# Patient Record
Sex: MALE | Race: WHITE | NOT HISPANIC OR LATINO
[De-identification: names, ages, dates, MRNs, and addresses within clinical notes are randomized per-mention and may not be internally consistent; named-entity substitution may affect disease eponyms.]

---

## 2019-01-04 PROBLEM — Z00.00 ENCOUNTER FOR PREVENTIVE HEALTH EXAMINATION: Status: ACTIVE | Noted: 2019-01-04

## 2019-01-15 ENCOUNTER — APPOINTMENT (OUTPATIENT)
Dept: SURGERY | Facility: CLINIC | Age: 64
End: 2019-01-15

## 2019-01-15 ENCOUNTER — OUTPATIENT (OUTPATIENT)
Dept: OUTPATIENT SERVICES | Facility: HOSPITAL | Age: 64
LOS: 1 days | End: 2019-01-15
Payer: COMMERCIAL

## 2019-01-15 VITALS
HEART RATE: 60 BPM | SYSTOLIC BLOOD PRESSURE: 133 MMHG | WEIGHT: 288.81 LBS | TEMPERATURE: 99 F | DIASTOLIC BLOOD PRESSURE: 76 MMHG | RESPIRATION RATE: 16 BRPM | OXYGEN SATURATION: 98 % | HEIGHT: 78 IN

## 2019-01-15 DIAGNOSIS — Z01.818 ENCOUNTER FOR OTHER PREPROCEDURAL EXAMINATION: ICD-10-CM

## 2019-01-15 DIAGNOSIS — Z90.89 ACQUIRED ABSENCE OF OTHER ORGANS: Chronic | ICD-10-CM

## 2019-01-15 DIAGNOSIS — Z98.890 OTHER SPECIFIED POSTPROCEDURAL STATES: Chronic | ICD-10-CM

## 2019-01-15 DIAGNOSIS — I10 ESSENTIAL (PRIMARY) HYPERTENSION: ICD-10-CM

## 2019-01-15 DIAGNOSIS — E11.9 TYPE 2 DIABETES MELLITUS WITHOUT COMPLICATIONS: ICD-10-CM

## 2019-01-15 DIAGNOSIS — M10.9 GOUT, UNSPECIFIED: ICD-10-CM

## 2019-01-15 DIAGNOSIS — M51.26 OTHER INTERVERTEBRAL DISC DISPLACEMENT, LUMBAR REGION: ICD-10-CM

## 2019-01-15 DIAGNOSIS — Z98.1 ARTHRODESIS STATUS: Chronic | ICD-10-CM

## 2019-01-15 DIAGNOSIS — E78.00 PURE HYPERCHOLESTEROLEMIA, UNSPECIFIED: ICD-10-CM

## 2019-01-15 DIAGNOSIS — E11.42 TYPE 2 DIABETES MELLITUS WITH DIABETIC POLYNEUROPATHY: ICD-10-CM

## 2019-01-15 DIAGNOSIS — Z90.49 ACQUIRED ABSENCE OF OTHER SPECIFIED PARTS OF DIGESTIVE TRACT: Chronic | ICD-10-CM

## 2019-01-15 LAB
ALBUMIN SERPL ELPH-MCNC: 4.5 G/DL — SIGNIFICANT CHANGE UP (ref 3.3–5)
ALP SERPL-CCNC: 81 U/L — SIGNIFICANT CHANGE UP (ref 40–120)
ALT FLD-CCNC: 16 U/L — SIGNIFICANT CHANGE UP (ref 10–45)
ANION GAP SERPL CALC-SCNC: 10 MMOL/L — SIGNIFICANT CHANGE UP (ref 5–17)
APTT BLD: 29.8 SEC — SIGNIFICANT CHANGE UP (ref 27.5–36.3)
AST SERPL-CCNC: 11 U/L — SIGNIFICANT CHANGE UP (ref 10–40)
BASOPHILS NFR BLD AUTO: 0.3 % — SIGNIFICANT CHANGE UP (ref 0–2)
BILIRUB SERPL-MCNC: 0.6 MG/DL — SIGNIFICANT CHANGE UP (ref 0.2–1.2)
BUN SERPL-MCNC: 20 MG/DL — SIGNIFICANT CHANGE UP (ref 7–23)
CALCIUM SERPL-MCNC: 9.6 MG/DL — SIGNIFICANT CHANGE UP (ref 8.4–10.5)
CHLORIDE SERPL-SCNC: 103 MMOL/L — SIGNIFICANT CHANGE UP (ref 96–108)
CO2 SERPL-SCNC: 27 MMOL/L — SIGNIFICANT CHANGE UP (ref 22–31)
CREAT SERPL-MCNC: 1.44 MG/DL — HIGH (ref 0.5–1.3)
EOSINOPHIL NFR BLD AUTO: 0.5 % — SIGNIFICANT CHANGE UP (ref 0–6)
GLUCOSE SERPL-MCNC: 155 MG/DL — HIGH (ref 70–99)
HCT VFR BLD CALC: 50.4 % — HIGH (ref 39–50)
HGB BLD-MCNC: 16.6 G/DL — SIGNIFICANT CHANGE UP (ref 13–17)
INR BLD: 1.01 — SIGNIFICANT CHANGE UP (ref 0.88–1.16)
LYMPHOCYTES # BLD AUTO: 26.3 % — SIGNIFICANT CHANGE UP (ref 13–44)
MCHC RBC-ENTMCNC: 31.4 PG — SIGNIFICANT CHANGE UP (ref 27–34)
MCHC RBC-ENTMCNC: 32.9 G/DL — SIGNIFICANT CHANGE UP (ref 32–36)
MCV RBC AUTO: 95.5 FL — SIGNIFICANT CHANGE UP (ref 80–100)
MONOCYTES NFR BLD AUTO: 4.5 % — SIGNIFICANT CHANGE UP (ref 2–14)
NEUTROPHILS NFR BLD AUTO: 68.4 % — SIGNIFICANT CHANGE UP (ref 43–77)
PLATELET # BLD AUTO: 189 K/UL — SIGNIFICANT CHANGE UP (ref 150–400)
POTASSIUM SERPL-MCNC: 5 MMOL/L — SIGNIFICANT CHANGE UP (ref 3.5–5.3)
POTASSIUM SERPL-SCNC: 5 MMOL/L — SIGNIFICANT CHANGE UP (ref 3.5–5.3)
PROT SERPL-MCNC: 7.6 G/DL — SIGNIFICANT CHANGE UP (ref 6–8.3)
PROTHROM AB SERPL-ACNC: 11.4 SEC — SIGNIFICANT CHANGE UP (ref 10–12.9)
RBC # BLD: 5.28 M/UL — SIGNIFICANT CHANGE UP (ref 4.2–5.8)
RBC # FLD: 13.9 % — SIGNIFICANT CHANGE UP (ref 10.3–16.9)
SODIUM SERPL-SCNC: 140 MMOL/L — SIGNIFICANT CHANGE UP (ref 135–145)
WBC # BLD: 10.5 K/UL — SIGNIFICANT CHANGE UP (ref 3.8–10.5)
WBC # FLD AUTO: 10.5 K/UL — SIGNIFICANT CHANGE UP (ref 3.8–10.5)

## 2019-01-15 PROCEDURE — 71046 X-RAY EXAM CHEST 2 VIEWS: CPT | Mod: 26

## 2019-01-15 PROCEDURE — 93010 ELECTROCARDIOGRAM REPORT: CPT

## 2019-01-15 NOTE — H&P PST ADULT - PROBLEM SELECTOR PLAN 1
Preop testing results reviewed. No medical contraindications for the proposed surgery. Preop testing results reviewed. No medical contraindications for the proposed surgery. Patient with right sciatica

## 2019-01-15 NOTE — H&P PST ADULT - PSH
History of appendectomy  1968  History of lumbar fusion  with hardware ( [id number]  History of surgery  cervical fusion with hardware (2009)  History of tonsillectomy  childhood

## 2019-01-15 NOTE — ASU PATIENT PROFILE, ADULT - PSH
History of appendectomy  1968  History of lumbar fusion  with hardware ( 54594  History of surgery  cervical fusion with hardware (2009)  History of tonsillectomy  childhood

## 2019-01-15 NOTE — H&P PST ADULT - HISTORY OF PRESENT ILLNESS
63 year old male with HNP L2-L3 with moderate low back pain.  MRI confirmed diagnosis.  Symptoms worse after prolonged sitting and persist over time. 63 year old white  male with HNP L2-L3 with moderate low back pain with right sided sciatica.  MRI confirmed diagnosis.  Symptoms worse after prolonged sitting and persist over time.  February , 2009 had fusion of lumbar spine below this level and his Cervical spine.  No trauma, just worn out from working.  No significant improvement with conservative rx.

## 2019-01-15 NOTE — H&P PST ADULT - FAMILY HISTORY
Aunt  Still living? No  Family history of diabetes mellitus in first degree relative, Age at diagnosis: Age Unknown

## 2019-01-15 NOTE — H&P PST ADULT - NSANTHOSAYNRD_GEN_A_CORE
No. RAINA screening performed.  STOP BANG Legend: 0-2 = LOW Risk; 3-4 = INTERMEDIATE Risk; 5-8 = HIGH Risk

## 2019-01-15 NOTE — ASU PATIENT PROFILE, ADULT - PMH
Arthritis    Diabetes    High cholesterol    Hypertension Arthritis    Diabetes    Gout    High cholesterol    Hypertension

## 2019-01-30 VITALS
WEIGHT: 282.19 LBS | HEIGHT: 78 IN | DIASTOLIC BLOOD PRESSURE: 59 MMHG | RESPIRATION RATE: 16 BRPM | TEMPERATURE: 98 F | SYSTOLIC BLOOD PRESSURE: 119 MMHG | HEART RATE: 58 BPM | OXYGEN SATURATION: 96 %

## 2019-01-31 ENCOUNTER — INPATIENT (INPATIENT)
Facility: HOSPITAL | Age: 64
LOS: 0 days | Discharge: HOME | DRG: 460 | End: 2019-02-01
Attending: ORTHOPAEDIC SURGERY | Admitting: ORTHOPAEDIC SURGERY
Payer: COMMERCIAL

## 2019-01-31 DIAGNOSIS — Z98.890 OTHER SPECIFIED POSTPROCEDURAL STATES: Chronic | ICD-10-CM

## 2019-01-31 DIAGNOSIS — M51.26 OTHER INTERVERTEBRAL DISC DISPLACEMENT, LUMBAR REGION: ICD-10-CM

## 2019-01-31 DIAGNOSIS — Z98.1 ARTHRODESIS STATUS: Chronic | ICD-10-CM

## 2019-01-31 DIAGNOSIS — I10 ESSENTIAL (PRIMARY) HYPERTENSION: ICD-10-CM

## 2019-01-31 DIAGNOSIS — Z90.49 ACQUIRED ABSENCE OF OTHER SPECIFIED PARTS OF DIGESTIVE TRACT: Chronic | ICD-10-CM

## 2019-01-31 DIAGNOSIS — Z90.89 ACQUIRED ABSENCE OF OTHER ORGANS: Chronic | ICD-10-CM

## 2019-01-31 DIAGNOSIS — M10.9 GOUT, UNSPECIFIED: ICD-10-CM

## 2019-01-31 DIAGNOSIS — E11.9 TYPE 2 DIABETES MELLITUS WITHOUT COMPLICATIONS: ICD-10-CM

## 2019-01-31 DIAGNOSIS — E78.00 PURE HYPERCHOLESTEROLEMIA, UNSPECIFIED: ICD-10-CM

## 2019-01-31 LAB
GLUCOSE BLDC GLUCOMTR-MCNC: 140 MG/DL — HIGH (ref 70–99)
GLUCOSE BLDC GLUCOMTR-MCNC: 160 MG/DL — HIGH (ref 70–99)
GLUCOSE BLDC GLUCOMTR-MCNC: 217 MG/DL — HIGH (ref 70–99)

## 2019-01-31 RX ORDER — ACETAMINOPHEN 500 MG
650 TABLET ORAL EVERY 6 HOURS
Qty: 0 | Refills: 0 | Status: DISCONTINUED | OUTPATIENT
Start: 2019-01-31 | End: 2019-02-01

## 2019-01-31 RX ORDER — DEXTROSE 50 % IN WATER 50 %
12.5 SYRINGE (ML) INTRAVENOUS ONCE
Qty: 0 | Refills: 0 | Status: DISCONTINUED | OUTPATIENT
Start: 2019-01-31 | End: 2019-02-01

## 2019-01-31 RX ORDER — DOCUSATE SODIUM 100 MG
100 CAPSULE ORAL THREE TIMES A DAY
Qty: 0 | Refills: 0 | Status: DISCONTINUED | OUTPATIENT
Start: 2019-01-31 | End: 2019-02-01

## 2019-01-31 RX ORDER — INSULIN LISPRO 100/ML
VIAL (ML) SUBCUTANEOUS
Qty: 0 | Refills: 0 | Status: DISCONTINUED | OUTPATIENT
Start: 2019-01-31 | End: 2019-02-01

## 2019-01-31 RX ORDER — DEXTROSE 50 % IN WATER 50 %
25 SYRINGE (ML) INTRAVENOUS ONCE
Qty: 0 | Refills: 0 | Status: DISCONTINUED | OUTPATIENT
Start: 2019-01-31 | End: 2019-02-01

## 2019-01-31 RX ORDER — MORPHINE SULFATE 50 MG/1
4 CAPSULE, EXTENDED RELEASE ORAL EVERY 6 HOURS
Qty: 0 | Refills: 0 | Status: DISCONTINUED | OUTPATIENT
Start: 2019-01-31 | End: 2019-01-31

## 2019-01-31 RX ORDER — ATORVASTATIN CALCIUM 80 MG/1
20 TABLET, FILM COATED ORAL AT BEDTIME
Qty: 0 | Refills: 0 | Status: DISCONTINUED | OUTPATIENT
Start: 2019-01-31 | End: 2019-02-01

## 2019-01-31 RX ORDER — VANCOMYCIN HCL 1 G
1000 VIAL (EA) INTRAVENOUS EVERY 12 HOURS
Qty: 0 | Refills: 0 | Status: DISCONTINUED | OUTPATIENT
Start: 2019-02-01 | End: 2019-02-01

## 2019-01-31 RX ORDER — SODIUM CHLORIDE 9 MG/ML
1000 INJECTION, SOLUTION INTRAVENOUS
Qty: 0 | Refills: 0 | Status: DISCONTINUED | OUTPATIENT
Start: 2019-01-31 | End: 2019-02-01

## 2019-01-31 RX ORDER — BUPIVACAINE 13.3 MG/ML
20 INJECTION, SUSPENSION, LIPOSOMAL INFILTRATION ONCE
Qty: 0 | Refills: 0 | Status: DISCONTINUED | OUTPATIENT
Start: 2019-01-31 | End: 2019-02-01

## 2019-01-31 RX ORDER — HYDROMORPHONE HYDROCHLORIDE 2 MG/ML
0.5 INJECTION INTRAMUSCULAR; INTRAVENOUS; SUBCUTANEOUS EVERY 6 HOURS
Qty: 0 | Refills: 0 | Status: DISCONTINUED | OUTPATIENT
Start: 2019-01-31 | End: 2019-02-01

## 2019-01-31 RX ORDER — ONDANSETRON 8 MG/1
4 TABLET, FILM COATED ORAL EVERY 6 HOURS
Qty: 0 | Refills: 0 | Status: DISCONTINUED | OUTPATIENT
Start: 2019-01-31 | End: 2019-02-01

## 2019-01-31 RX ORDER — PIOGLITAZONE HYDROCHLORIDE 15 MG/1
30 TABLET ORAL DAILY
Qty: 0 | Refills: 0 | Status: DISCONTINUED | OUTPATIENT
Start: 2019-01-31 | End: 2019-02-01

## 2019-01-31 RX ORDER — LISINOPRIL 2.5 MG/1
20 TABLET ORAL DAILY
Qty: 0 | Refills: 0 | Status: DISCONTINUED | OUTPATIENT
Start: 2019-01-31 | End: 2019-02-01

## 2019-01-31 RX ORDER — GLUCAGON INJECTION, SOLUTION 0.5 MG/.1ML
1 INJECTION, SOLUTION SUBCUTANEOUS ONCE
Qty: 0 | Refills: 0 | Status: DISCONTINUED | OUTPATIENT
Start: 2019-01-31 | End: 2019-02-01

## 2019-01-31 RX ORDER — OXYCODONE HYDROCHLORIDE 5 MG/1
10 TABLET ORAL EVERY 4 HOURS
Qty: 0 | Refills: 0 | Status: DISCONTINUED | OUTPATIENT
Start: 2019-01-31 | End: 2019-02-01

## 2019-01-31 RX ORDER — DEXTROSE 50 % IN WATER 50 %
15 SYRINGE (ML) INTRAVENOUS ONCE
Qty: 0 | Refills: 0 | Status: DISCONTINUED | OUTPATIENT
Start: 2019-01-31 | End: 2019-02-01

## 2019-01-31 RX ORDER — SENNA PLUS 8.6 MG/1
2 TABLET ORAL AT BEDTIME
Qty: 0 | Refills: 0 | Status: DISCONTINUED | OUTPATIENT
Start: 2019-01-31 | End: 2019-02-01

## 2019-01-31 RX ORDER — ALLOPURINOL 300 MG
300 TABLET ORAL DAILY
Qty: 0 | Refills: 0 | Status: DISCONTINUED | OUTPATIENT
Start: 2019-01-31 | End: 2019-02-01

## 2019-01-31 RX ORDER — HEPARIN SODIUM 5000 [USP'U]/ML
5000 INJECTION INTRAVENOUS; SUBCUTANEOUS EVERY 8 HOURS
Qty: 0 | Refills: 0 | Status: DISCONTINUED | OUTPATIENT
Start: 2019-01-31 | End: 2019-02-01

## 2019-01-31 RX ADMIN — SENNA PLUS 2 TABLET(S): 8.6 TABLET ORAL at 21:52

## 2019-01-31 RX ADMIN — Medication 100 MILLIGRAM(S): at 21:52

## 2019-01-31 RX ADMIN — OXYCODONE HYDROCHLORIDE 10 MILLIGRAM(S): 5 TABLET ORAL at 21:59

## 2019-01-31 RX ADMIN — SODIUM CHLORIDE 80 MILLILITER(S): 9 INJECTION, SOLUTION INTRAVENOUS at 21:49

## 2019-01-31 RX ADMIN — HEPARIN SODIUM 5000 UNIT(S): 5000 INJECTION INTRAVENOUS; SUBCUTANEOUS at 21:50

## 2019-01-31 RX ADMIN — OXYCODONE HYDROCHLORIDE 10 MILLIGRAM(S): 5 TABLET ORAL at 23:00

## 2019-01-31 NOTE — H&P ADULT - PROBLEM SELECTOR PLAN 1
Admit to Orthopaedic Service.  Presents today for elective  posterior spinal fusion, lumbar laminectomy L2-L3.  Pt medically stable and cleared for procedure today by Dr. Montes

## 2019-01-31 NOTE — CONSULT NOTE ADULT - SUBJECTIVE AND OBJECTIVE BOX
HPI:  63 year old white  male with HNP L2-L3 with moderate low back pain with right sided sciatica.  MRI confirmed diagnosis.  Symptoms worse after prolonged sitting and persist over time.  February , 2009 had fusion of lumbar spine below this level and his Cervical spine.  No trauma, just worn out from working.  No significant improvement with conservative rx.    PAST MEDICAL & SURGICAL HISTORY:  Gout  Arthritis  Diabetes  Hypertension  High cholesterol  History of surgery: cervical fusion with hardware (2009)  History of lumbar fusion: with hardware ( [id number]  History of appendectomy: 1968  History of tonsillectomy: childhood      REVIEW OF SYSTEMS    General:  malaise	  Skin/Breast: normal  Ophthalmologic: negative  ENMT:	normal  Respiratory and Thorax: normal  Cardiovascular:	normal  Gastrointestinal:	normal  Genitourinary:	normal  Musculoskeletal: low back pain  Neurological:	normal  Psychiatric:	normal  Hematology/Lymphatics:	 negative  Endocrine:	negative  Allergic/Immunologic:	negative      MEDICATIONS    pravastatin 80 mg oral tablet: Last Dose Taken:  , 1 tab(s) orally once a day  · 	Bystolic 10 mg oral tablet: Last Dose Taken:  , 1 tab(s) orally once a day  · 	glimepiride 4 mg oral tablet: Last Dose Taken:  , 1 tab(s) orally once a day  · 	quinapril 20 mg oral tablet: Last Dose Taken:  , 1 tab(s) orally once a day  · 	pioglitazone 30 mg oral tablet: Last Dose Taken:  , 1 tab(s) orally once a day  · 	Tradjenta 5 mg oral tablet: 1 tab(s) orally once a day  · 	allopurinol 300 mg oral tablet: Last Dose Taken:  , 1 tab(s) orally once a day  · 	Invokana 100 mg oral tablet: Last Dose Taken:  , 1 tab(s) orally once a day  · 	Vitamin D3 5000 intl units oral capsule: 1 cap(s) orally once a day    Allergies    No Known Allergies    SOCIAL HISTORY: 1.5 PPD cigs social alcohol    FAMILY HISTORY:  Family history of diabetes mellitus in first degree relative (Aunt)      PHYSICAL EXAM:     Vital Signs Last 24 Hrs  T(C): --  T(F): --98.6  HR: --72  BP: --120/80  BP(mean): --  RR: --  SpO2: --16    Constitutional: WDWNM in NAD  Eyes: conj pink  ENMT: negative  Neck: supple  Breasts: not examined   Back: + SLR  Respiratory: clear to P&A  Cardiovascular: no MRGT or H  Gastrointestinal: normal bowel sounds  Genitourinary: neg  Rectal: not examined  Extremities: normal  Vascular: normal  Neurological: normal  Skin: negative  Lymph Nodes: negative  Musculoskeletal:   normal  Psychiatric: anxiety

## 2019-01-31 NOTE — H&P ADULT - NSHPLABSRESULTS_GEN_ALL_CORE
Preop CBC/CMP/PTT/PT/INR - WNL reviewed by medical clearance  Preop UA - ordered for day of surgery  Preop EKG - WNL reviewed by medical clearance  Preop CXR - WNL reviewed by medical clearance   Nasal swab - negative for MRSA/MSSA

## 2019-01-31 NOTE — H&P ADULT - NSHPPHYSICALEXAM_GEN_ALL_CORE
Gen: NAD  PE: No deformity or open wounds. No rashes or lesions. EHL/TA/GS/FHL 5/5 BLE. Sensation intact and equal BLE. Skin warm and well perfused. DP palpable BLE. Range of motion lumbar spine limited secondary to pain. Gen: NAD  PE: Skin warm and well perfused. No rashes noted. Healing scab on right shin. EHL/TA/GS/FHL 5/5 BLE. Sensation intact and equal BLE. DP palpable BLE. Range of motion lumbar spine limited secondary to pain.

## 2019-01-31 NOTE — H&P ADULT - HISTORY OF PRESENT ILLNESS
63 year old male presents with low back pain x >10 years. The patient states he had cervical/lumbar spinal surgery in february of 2009. He denies preceding accident/injury or trauma to the spine and states that he has just "worn himself out." The patient complains of persistent pain in his lower back which radiates down his right leg and is associated with numbness/tingling. The patient failed conservative therapies for his symptoms. He denies the use of assistive walking devices and states that he ambulates freely without assistance. Denies bowel/bladder incontinence. No hx of DVT. The patient feels well today and denies fever/chills/chest pain/shortness of breath.   Presents today for elective posterior spinal fusion, lumbar laminectomy L2-L3.

## 2019-01-31 NOTE — H&P ADULT - PSH
History of appendectomy  1968  History of lumbar fusion  with hardware ( [id number]  History of surgery  cervical fusion with hardware (2009)  History of tonsillectomy  childhood

## 2019-01-31 NOTE — BRIEF OPERATIVE NOTE - PROCEDURE
<<-----Click on this checkbox to enter Procedure Posterior spinal fusion  01/31/2019    Active  CNELSON

## 2019-02-01 ENCOUNTER — TRANSCRIPTION ENCOUNTER (OUTPATIENT)
Age: 64
End: 2019-02-01

## 2019-02-01 VITALS
SYSTOLIC BLOOD PRESSURE: 119 MMHG | HEART RATE: 67 BPM | DIASTOLIC BLOOD PRESSURE: 66 MMHG | RESPIRATION RATE: 18 BRPM | OXYGEN SATURATION: 95 % | TEMPERATURE: 99 F

## 2019-02-01 DIAGNOSIS — F41.1 GENERALIZED ANXIETY DISORDER: ICD-10-CM

## 2019-02-01 LAB
ANION GAP SERPL CALC-SCNC: 13 MMOL/L — SIGNIFICANT CHANGE UP (ref 5–17)
BUN SERPL-MCNC: 40 MG/DL — HIGH (ref 7–23)
CALCIUM SERPL-MCNC: 8.8 MG/DL — SIGNIFICANT CHANGE UP (ref 8.4–10.5)
CHLORIDE SERPL-SCNC: 96 MMOL/L — SIGNIFICANT CHANGE UP (ref 96–108)
CO2 SERPL-SCNC: 24 MMOL/L — SIGNIFICANT CHANGE UP (ref 22–31)
CREAT SERPL-MCNC: 2.1 MG/DL — HIGH (ref 0.5–1.3)
GLUCOSE BLDC GLUCOMTR-MCNC: 319 MG/DL — HIGH (ref 70–99)
GLUCOSE SERPL-MCNC: 305 MG/DL — HIGH (ref 70–99)
HCT VFR BLD CALC: 44.3 % — SIGNIFICANT CHANGE UP (ref 39–50)
HGB BLD-MCNC: 14.7 G/DL — SIGNIFICANT CHANGE UP (ref 13–17)
MCHC RBC-ENTMCNC: 31.8 PG — SIGNIFICANT CHANGE UP (ref 27–34)
MCHC RBC-ENTMCNC: 33.2 G/DL — SIGNIFICANT CHANGE UP (ref 32–36)
MCV RBC AUTO: 95.9 FL — SIGNIFICANT CHANGE UP (ref 80–100)
PLATELET # BLD AUTO: 158 K/UL — SIGNIFICANT CHANGE UP (ref 150–400)
POTASSIUM SERPL-MCNC: 4.5 MMOL/L — SIGNIFICANT CHANGE UP (ref 3.5–5.3)
POTASSIUM SERPL-SCNC: 4.5 MMOL/L — SIGNIFICANT CHANGE UP (ref 3.5–5.3)
RBC # BLD: 4.62 M/UL — SIGNIFICANT CHANGE UP (ref 4.2–5.8)
RBC # FLD: 13.9 % — SIGNIFICANT CHANGE UP (ref 10.3–16.9)
SODIUM SERPL-SCNC: 133 MMOL/L — LOW (ref 135–145)
WBC # BLD: 13.3 K/UL — HIGH (ref 3.8–10.5)
WBC # FLD AUTO: 13.3 K/UL — HIGH (ref 3.8–10.5)

## 2019-02-01 RX ORDER — DOCUSATE SODIUM 100 MG
1 CAPSULE ORAL
Qty: 0 | Refills: 0 | DISCHARGE
Start: 2019-02-01

## 2019-02-01 RX ADMIN — HEPARIN SODIUM 5000 UNIT(S): 5000 INJECTION INTRAVENOUS; SUBCUTANEOUS at 05:36

## 2019-02-01 RX ADMIN — OXYCODONE HYDROCHLORIDE 10 MILLIGRAM(S): 5 TABLET ORAL at 08:07

## 2019-02-01 RX ADMIN — OXYCODONE HYDROCHLORIDE 10 MILLIGRAM(S): 5 TABLET ORAL at 07:13

## 2019-02-01 RX ADMIN — Medication 250 MILLIGRAM(S): at 03:25

## 2019-02-01 NOTE — PROVIDER CONTACT NOTE (OTHER) - RECOMMENDATIONS
notify provider and provider speak with pt and encourage pt to take medications listed in emar and not home meds

## 2019-02-01 NOTE — DISCHARGE NOTE ADULT - HOSPITAL COURSE
Admit to Orthopaedics for Revision PSF L2-S1  Perioperative Antibiotics  DVT prophylaxis  Physical Therapy  Pain Management

## 2019-02-01 NOTE — DISCHARGE NOTE ADULT - CARE PLAN
Principal Discharge DX:	Herniated nucleus pulposus, L2-3  Goal:	Improvement in pain and ambulation after surgery  Assessment and plan of treatment:	No strenuous activity (bending/twisting), heavy lifting, driving or returning to work until cleared by MD.  You may shower. Remove dressing daily before shower, pat the area dry gently then reapply dry gauze dressing x 5 days, then leave incision open to air.   Try to have regular bowel movements, take stool softener or laxative if necessary.  May take pepcid or zantac for upset stomach.  Ice affected areas to decrease swelling.  Call to schedule an appt with Dr. Wu for follow up, if you have staples or sutures they will be removed in office.  Contact your doctor if you experience: fever greater than 101.5, chills, chest pain, difficulty breathing, redness or excessive drainage around the incision, other concerns.

## 2019-02-01 NOTE — PROGRESS NOTE ADULT - SUBJECTIVE AND OBJECTIVE BOX
HPI:  63 year old white male presents with low back pain x >10 years with DDD lumbar spine . The patient states he had cervical/lumbar spinal surgery in february of 2009. He denies preceding accident/injury or trauma to the spine and states that he has just "worn himself out." The patient complains of persistent pain in his lower back which radiates down his right leg and is associated with numbness/tingling. The patient failed conservative therapies for his symptoms. He denies the use of assistive walking devices and states that he ambulates freely without assistance. Denies bowel/bladder incontinence. No hx of DVT. The patient feels well today and denies fever/chills/chest pain/shortness of breath.  Patient day #1 s/p elective  revision of posterior spinal fusion, lumbar laminectomy L2-L3 with moderate low back pain and malaise. Patient refusing insulin coverage for his diabetes.    PAST MEDICAL & SURGICAL HISTORY:  Gout  Arthritis  Diabetes  Hypertension  High cholesterol  History of surgery: cervical fusion with hardware (2009)  History of lumbar fusion: with hardware ( 20090  History of appendectomy: 1968  History of tonsillectomy: childhood      MEDICATIONS  (STANDING):  allopurinol 300 milliGRAM(s) Oral daily  atorvastatin 20 milliGRAM(s) Oral at bedtime  BUpivacaine liposome 1.3% Injectable (no eMAR) 20 milliLiter(s) Local Injection once  dextrose 5%. 1000 milliLiter(s) (50 mL/Hr) IV Continuous <Continuous>  dextrose 50% Injectable 12.5 Gram(s) IV Push once  dextrose 50% Injectable 25 Gram(s) IV Push once  dextrose 50% Injectable 25 Gram(s) IV Push once  docusate sodium 100 milliGRAM(s) Oral three times a day  heparin  Injectable 5000 Unit(s) SubCutaneous every 8 hours  insulin lispro (HumaLOG) corrective regimen sliding scale   SubCutaneous Before meals and at bedtime  lactated ringers. 1000 milliLiter(s) (80 mL/Hr) IV Continuous <Continuous>  lisinopril 20 milliGRAM(s) Oral daily  pioglitazone 30 milliGRAM(s) Oral daily  senna 2 Tablet(s) Oral at bedtime  vancomycin  IVPB 1000 milliGRAM(s) IV Intermittent every 12 hours    MEDICATIONS  (PRN):  acetaminophen   Tablet .. 650 milliGRAM(s) Oral every 6 hours PRN Temp greater or equal to 38C (100.4F), Mild Pain (1 - 3)  dextrose 40% Gel 15 Gram(s) Oral once PRN Blood Glucose LESS THAN 70 milliGRAM(s)/deciliter  glucagon  Injectable 1 milliGRAM(s) IntraMuscular once PRN Glucose LESS THAN 70 milligrams/deciliter  HYDROmorphone  Injectable 0.5 milliGRAM(s) IV Push every 6 hours PRN Severe Pain (7 - 10)  ondansetron Injectable 4 milliGRAM(s) IV Push every 6 hours PRN Nausea  oxyCODONE    IR 10 milliGRAM(s) Oral every 4 hours PRN Moderate Pain (4 - 6)    Allergies    No Known Allergies    REVIEW OF SYSTEMS    General:  malaise	  Skin/Breast: normal  Ophthalmologic: negative  ENMT:	normal  Respiratory and Thorax: normal  Cardiovascular:	normal  Gastrointestinal:	normal  Genitourinary:	normal  Musculoskeletal:	  normal  Neurological:	low back pain  Psychiatric:	normal  Hematology/Lymphatics:	 negative  Endocrine:	negative  Allergic/Immunologic:	negative      PHYSICAL EXAM:     Vital Signs Last 24 Hrs  T(C): 36.6 (01 Feb 2019 05:33), Max: 36.7 (31 Jan 2019 17:08)  T(F): 97.8 (01 Feb 2019 05:33), Max: 98.1 (31 Jan 2019 17:08)  HR: 76 (01 Feb 2019 05:33) (60 - 76)  BP: 106/57 (01 Feb 2019 05:33) (106/54 - 198/67)  BP(mean): 93 (31 Jan 2019 20:30) (76 - 111)  RR: 18 (01 Feb 2019 00:36) (14 - 20)  SpO2: 94% (01 Feb 2019 05:33) (93% - 99%)    Constitutional: WDWNM  Eyes: conj pale  ENMT: negative  Neck: supple  Breasts: not examined   Back: + SLR  Respiratory: clear to P&A  Cardiovascular: no MRGT or H  Gastrointestinal: normal bowel sounds  Genitourinary: neg  Rectal: not examined  Extremities:    Vascular: normal  Neurological: normal  Skin: negative  Lymph Nodes: negative  Musculoskeletal:   normal  Psychiatric: anxiety
Ortho Note    Post-operative day #1 s/p revision PSF L2-S1    Subjective:     Patient seen and examined. Patient comfortable without complaints, pain controlled. Patient is eager to go home today.   Denies chest pain, SOB, N/V, numbness/tingling.    Objective:    Vital Signs Last 24 Hrs  T(C): 37.3 (02-01-19 @ 08:58), Max: 37.3 (02-01-19 @ 08:58)  T(F): 99.1 (02-01-19 @ 08:58), Max: 99.1 (02-01-19 @ 08:58)  HR: 67 (02-01-19 @ 08:58) (67 - 76)  BP: 119/66 (02-01-19 @ 08:58) (106/57 - 119/66)  BP(mean): --  RR: 18 (02-01-19 @ 08:58) (18 - 18)  SpO2: 95% (02-01-19 @ 08:58) (94% - 95%)  AVSS    PE:  General: Patient alert and oriented, NAD  Dressing: C/D/I back  Pulses: DP palpable bilateral lower extremities  Sensation: intact to bilateral lower extremities  Motor: EHL/FHL/TA/GS 5/5 bilateral lower extremities                           14.7   13.3  )-----------( 158      ( 01 Feb 2019 10:41 )             44.3   01 Feb 2019 10:41    133    |  96     |  40     ----------------------------<  305    4.5     |  24     |  2.10     Ca    8.8        01 Feb 2019 10:41        A/P: 63yMale POD#1 s/p revision PSF L2-S11. Pain control as needed  2. DVT prophylaxis: HSQ (can be d/c'd upon discharge)  3. PT, WBS: WBAT  4. Dispo: cleared PT, home today     Ortho Pager [phone]
Ortho Note    Pt comfortable without complaints, pain controlled  Denies CP, SOB, N/V, numbness/tingling     Vital Signs Last 24 Hrs  T(C): 37.3 (02-01-19 @ 08:58), Max: 37.3 (02-01-19 @ 08:58)  T(F): 99.1 (02-01-19 @ 08:58), Max: 99.1 (02-01-19 @ 08:58)  HR: 67 (02-01-19 @ 08:58) (67 - 76)  BP: 119/66 (02-01-19 @ 08:58) (106/57 - 119/66)  BP(mean): --  RR: 18 (02-01-19 @ 08:58) (18 - 18)  SpO2: 95% (02-01-19 @ 08:58) (94% - 95%)    Pt Alert and oriented, NAD  Back DSG C/D/I  Pulses: 2+ DP/PT  Sensation: s/s/sp/dp/t intact   Motor: EHL/FHL/TA/GS 5/5        A/P: 63M s/p Revision PSF L2-S1  - Stable  - Pain Control  - DVT ppx: SCDs, SQH  - PT, WBS: WBAT  - f/u AM labs  - dispo home    Ortho Pager [phone]
Ortho Post Op Check    Procedure: Revision PSF L2-S1  Surgeon: Bryce, MD    Pt comfortable without complaints, pain controlled  Denies CP, SOB, N/V, numbness/tingling     Vital Signs Last 24 Hrs  T(C): 36.7 (01-31-19 @ 17:08), Max: 36.7 (01-31-19 @ 17:08)  T(F): 98.1 (01-31-19 @ 17:08), Max: 98.1 (01-31-19 @ 17:08)  HR: 60 (01-31-19 @ 18:38) (60 - 76)  BP: 134/61 (01-31-19 @ 18:38) (106/54 - 198/67)  BP(mean): 84 (01-31-19 @ 18:38) (76 - 111)  RR: 7 (01-31-19 @ 18:38) (7 - 20)  SpO2: 93% (01-31-19 @ 18:38) (93% - 97%)    General: Pt Alert and oriented, NAD  Back DSG C/D/I  Pulses: 2+ DP/PT  Sensation: s/s/sp/dp/t intact   Motor: EHL/FHL/TA/GS 5/5      Post-op X-Ray: Intraop fluoro    A/P: 63yMale POD#0 s/p Revision PSF L2-S1  - Stable  - Pain Control  - DVT ppx: SCDs, SQH  - Post op abx: ancef periop  - PT, WBS: WBAT  - f/u AM labs  - dispo home tomorrow     Ortho Pager [phone]

## 2019-02-01 NOTE — DISCHARGE NOTE ADULT - CARE PROVIDER_API CALL
Jakob Wu)  Orthopaedic Surgery; Spine Surgery  [address] [place] [address]  Phone: [phone]  Fax: [phone]

## 2019-02-01 NOTE — PROGRESS NOTE ADULT - PROBLEM SELECTOR PLAN 1
Discussed with house staff and nursing staff.  Pain management, PT, incentive spirometer, DVT prophylaxis, repeat labs and discharge planning.  Patient s/p revision of PSF

## 2019-02-01 NOTE — PROVIDER CONTACT NOTE (OTHER) - SITUATION
PSF L2-S1 1/31/19. pt refusing insulin coverage as well as other medications that are in his home med rec

## 2019-02-01 NOTE — DISCHARGE NOTE ADULT - PLAN OF CARE
Improvement in pain and ambulation after surgery No strenuous activity (bending/twisting), heavy lifting, driving or returning to work until cleared by MD.  You may shower. Remove dressing daily before shower, pat the area dry gently then reapply dry gauze dressing x 5 days, then leave incision open to air.   Try to have regular bowel movements, take stool softener or laxative if necessary.  May take pepcid or zantac for upset stomach.  Ice affected areas to decrease swelling.  Call to schedule an appt with Dr. Wu for follow up, if you have staples or sutures they will be removed in office.  Contact your doctor if you experience: fever greater than 101.5, chills, chest pain, difficulty breathing, redness or excessive drainage around the incision, other concerns.

## 2019-02-01 NOTE — DISCHARGE NOTE ADULT - MEDICATION SUMMARY - MEDICATIONS TO TAKE
I will START or STAY ON the medications listed below when I get home from the hospital:    oxycodone-acetaminophen 5 mg-325 mg oral tablet  -- 1-2 tab(s) by mouth every 4- 6 hours, As Needed -for severe pain MDD:12   -- Caution federal law prohibits the transfer of this drug to any person other  than the person for whom it was prescribed.  May cause drowsiness.  Alcohol may intensify this effect.  Use care when operating dangerous machinery.  This prescription cannot be refilled.  This product contains acetaminophen.  Do not use  with any other product containing acetaminophen to prevent possible liver damage.  Using more of this medication than prescribed may cause serious breathing problems.    -- Indication: For Severe Pain    Aspir 81 oral delayed release tablet  -- 1 tab(s) by mouth once a day  -- Indication: For Home medication    quinapril 20 mg oral tablet  -- 1 tab(s) by mouth once a day  -- Indication: For Essential hypertension    pioglitazone 30 mg oral tablet  -- 1 tab(s) by mouth once a day  -- Indication: For Diabetes    Tradjenta 5 mg oral tablet  -- 1 tab(s) by mouth once a day  -- Indication: For Diabetes    glimepiride 4 mg oral tablet  -- 1 tab(s) by mouth once a day  -- Indication: For Diabetes    Invokana 100 mg oral tablet  -- 1 tab(s) by mouth once a day  -- Indication: For Diabetes    allopurinol 300 mg oral tablet  -- 1 tab(s) by mouth once a day  -- Indication: For Gout, unspecified cause, unspecified chronicity, unspecified site    pravastatin 80 mg oral tablet  -- 1 tab(s) by mouth once a day  -- Indication: For High cholesterol    Bystolic 10 mg oral tablet  -- 1 tab(s) by mouth once a day  -- Indication: For Essential hypertension    docusate sodium 100 mg oral capsule  -- 1 cap(s) by mouth 3 times a day  -- Indication: For Bowel Regimen    Vitamin D3 5000 intl units oral capsule  -- 1 cap(s) by mouth once a day  -- Indication: For Home medication

## 2019-02-01 NOTE — DISCHARGE NOTE ADULT - PATIENT PORTAL LINK FT
You can access the [facility], offered by [facility], by registering with the following website: [email or web address]

## 2019-02-01 NOTE — PROVIDER CONTACT NOTE (OTHER) - BACKGROUND
pt previously had surgery at [facility] 10 years ago and was not sure what meds he received as per pt. due to this experience, pt brought his own home meds in a container not pharmacy issued.

## 2019-02-04 PROCEDURE — 76000 FLUOROSCOPY <1 HR PHYS/QHP: CPT

## 2019-02-04 PROCEDURE — C1713: CPT

## 2019-02-04 PROCEDURE — 80048 BASIC METABOLIC PNL TOTAL CA: CPT

## 2019-02-04 PROCEDURE — C1889: CPT

## 2019-02-04 PROCEDURE — 85027 COMPLETE CBC AUTOMATED: CPT

## 2019-02-04 PROCEDURE — 36415 COLL VENOUS BLD VENIPUNCTURE: CPT

## 2019-02-04 PROCEDURE — 97161 PT EVAL LOW COMPLEX 20 MIN: CPT

## 2019-02-04 PROCEDURE — 82962 GLUCOSE BLOOD TEST: CPT

## 2019-02-11 DIAGNOSIS — E78.00 PURE HYPERCHOLESTEROLEMIA, UNSPECIFIED: ICD-10-CM

## 2019-02-11 DIAGNOSIS — I10 ESSENTIAL (PRIMARY) HYPERTENSION: ICD-10-CM

## 2019-02-11 DIAGNOSIS — M10.9 GOUT, UNSPECIFIED: ICD-10-CM

## 2019-02-11 DIAGNOSIS — M19.90 UNSPECIFIED OSTEOARTHRITIS, UNSPECIFIED SITE: ICD-10-CM

## 2019-02-11 DIAGNOSIS — Z79.84 LONG TERM (CURRENT) USE OF ORAL HYPOGLYCEMIC DRUGS: ICD-10-CM

## 2019-02-11 DIAGNOSIS — Z28.21 IMMUNIZATION NOT CARRIED OUT BECAUSE OF PATIENT REFUSAL: ICD-10-CM

## 2019-02-11 DIAGNOSIS — M51.16 INTERVERTEBRAL DISC DISORDERS WITH RADICULOPATHY, LUMBAR REGION: ICD-10-CM

## 2019-02-11 DIAGNOSIS — E11.9 TYPE 2 DIABETES MELLITUS WITHOUT COMPLICATIONS: ICD-10-CM

## 2019-02-11 DIAGNOSIS — F17.210 NICOTINE DEPENDENCE, CIGARETTES, UNCOMPLICATED: ICD-10-CM

## 2019-02-27 ENCOUNTER — OUTPATIENT (OUTPATIENT)
Dept: OUTPATIENT SERVICES | Facility: HOSPITAL | Age: 64
LOS: 1 days | End: 2019-02-27
Payer: COMMERCIAL

## 2019-02-27 DIAGNOSIS — Z90.89 ACQUIRED ABSENCE OF OTHER ORGANS: Chronic | ICD-10-CM

## 2019-02-27 DIAGNOSIS — Z98.890 OTHER SPECIFIED POSTPROCEDURAL STATES: Chronic | ICD-10-CM

## 2019-02-27 DIAGNOSIS — Z90.49 ACQUIRED ABSENCE OF OTHER SPECIFIED PARTS OF DIGESTIVE TRACT: Chronic | ICD-10-CM

## 2019-02-27 DIAGNOSIS — Z98.1 ARTHRODESIS STATUS: Chronic | ICD-10-CM

## 2019-02-27 PROBLEM — E11.9 TYPE 2 DIABETES MELLITUS WITHOUT COMPLICATIONS: Chronic | Status: ACTIVE | Noted: 2019-01-15

## 2019-02-27 PROBLEM — M10.9 GOUT, UNSPECIFIED: Chronic | Status: ACTIVE | Noted: 2019-01-15

## 2019-02-27 PROBLEM — M19.90 UNSPECIFIED OSTEOARTHRITIS, UNSPECIFIED SITE: Chronic | Status: ACTIVE | Noted: 2019-01-15

## 2019-02-27 PROBLEM — I10 ESSENTIAL (PRIMARY) HYPERTENSION: Chronic | Status: ACTIVE | Noted: 2019-01-15

## 2019-02-27 PROBLEM — E78.00 PURE HYPERCHOLESTEROLEMIA, UNSPECIFIED: Chronic | Status: ACTIVE | Noted: 2019-01-15

## 2019-02-27 PROCEDURE — 72100 X-RAY EXAM L-S SPINE 2/3 VWS: CPT

## 2019-02-27 PROCEDURE — 72100 X-RAY EXAM L-S SPINE 2/3 VWS: CPT | Mod: 26

## 2019-05-15 ENCOUNTER — OUTPATIENT (OUTPATIENT)
Dept: OUTPATIENT SERVICES | Facility: HOSPITAL | Age: 64
LOS: 1 days | End: 2019-05-15
Payer: COMMERCIAL

## 2019-05-15 DIAGNOSIS — Z90.49 ACQUIRED ABSENCE OF OTHER SPECIFIED PARTS OF DIGESTIVE TRACT: Chronic | ICD-10-CM

## 2019-05-15 DIAGNOSIS — Z98.1 ARTHRODESIS STATUS: Chronic | ICD-10-CM

## 2019-05-15 DIAGNOSIS — Z98.890 OTHER SPECIFIED POSTPROCEDURAL STATES: Chronic | ICD-10-CM

## 2019-05-15 DIAGNOSIS — Z90.89 ACQUIRED ABSENCE OF OTHER ORGANS: Chronic | ICD-10-CM

## 2019-05-15 PROCEDURE — 72100 X-RAY EXAM L-S SPINE 2/3 VWS: CPT

## 2019-05-15 PROCEDURE — 72100 X-RAY EXAM L-S SPINE 2/3 VWS: CPT | Mod: 26

## 2019-07-24 ENCOUNTER — OUTPATIENT (OUTPATIENT)
Dept: OUTPATIENT SERVICES | Facility: HOSPITAL | Age: 64
LOS: 1 days | End: 2019-07-24
Payer: COMMERCIAL

## 2019-07-24 DIAGNOSIS — Z98.1 ARTHRODESIS STATUS: Chronic | ICD-10-CM

## 2019-07-24 DIAGNOSIS — Z90.89 ACQUIRED ABSENCE OF OTHER ORGANS: Chronic | ICD-10-CM

## 2019-07-24 DIAGNOSIS — Z90.49 ACQUIRED ABSENCE OF OTHER SPECIFIED PARTS OF DIGESTIVE TRACT: Chronic | ICD-10-CM

## 2019-07-24 DIAGNOSIS — Z98.890 OTHER SPECIFIED POSTPROCEDURAL STATES: Chronic | ICD-10-CM

## 2019-07-24 PROCEDURE — 72100 X-RAY EXAM L-S SPINE 2/3 VWS: CPT | Mod: 26

## 2019-07-24 PROCEDURE — 72100 X-RAY EXAM L-S SPINE 2/3 VWS: CPT

## 2019-09-25 ENCOUNTER — OUTPATIENT (OUTPATIENT)
Dept: OUTPATIENT SERVICES | Facility: HOSPITAL | Age: 64
LOS: 1 days | End: 2019-09-25
Payer: COMMERCIAL

## 2019-09-25 DIAGNOSIS — Z98.1 ARTHRODESIS STATUS: Chronic | ICD-10-CM

## 2019-09-25 DIAGNOSIS — Z90.49 ACQUIRED ABSENCE OF OTHER SPECIFIED PARTS OF DIGESTIVE TRACT: Chronic | ICD-10-CM

## 2019-09-25 DIAGNOSIS — Z90.89 ACQUIRED ABSENCE OF OTHER ORGANS: Chronic | ICD-10-CM

## 2019-09-25 DIAGNOSIS — Z98.890 OTHER SPECIFIED POSTPROCEDURAL STATES: Chronic | ICD-10-CM

## 2019-09-25 PROCEDURE — 72100 X-RAY EXAM L-S SPINE 2/3 VWS: CPT | Mod: 26

## 2019-09-25 PROCEDURE — 72100 X-RAY EXAM L-S SPINE 2/3 VWS: CPT

## 2019-12-18 ENCOUNTER — OUTPATIENT (OUTPATIENT)
Dept: OUTPATIENT SERVICES | Facility: HOSPITAL | Age: 64
LOS: 1 days | End: 2019-12-18
Payer: COMMERCIAL

## 2019-12-18 DIAGNOSIS — Z90.49 ACQUIRED ABSENCE OF OTHER SPECIFIED PARTS OF DIGESTIVE TRACT: Chronic | ICD-10-CM

## 2019-12-18 DIAGNOSIS — Z98.890 OTHER SPECIFIED POSTPROCEDURAL STATES: Chronic | ICD-10-CM

## 2019-12-18 DIAGNOSIS — Z90.89 ACQUIRED ABSENCE OF OTHER ORGANS: Chronic | ICD-10-CM

## 2019-12-18 DIAGNOSIS — Z98.1 ARTHRODESIS STATUS: Chronic | ICD-10-CM

## 2019-12-18 PROCEDURE — 72100 X-RAY EXAM L-S SPINE 2/3 VWS: CPT | Mod: 26

## 2019-12-18 PROCEDURE — 72100 X-RAY EXAM L-S SPINE 2/3 VWS: CPT

## 2020-03-04 ENCOUNTER — OUTPATIENT (OUTPATIENT)
Dept: OUTPATIENT SERVICES | Facility: HOSPITAL | Age: 65
LOS: 1 days | End: 2020-03-04
Payer: COMMERCIAL

## 2020-03-04 DIAGNOSIS — Z90.89 ACQUIRED ABSENCE OF OTHER ORGANS: Chronic | ICD-10-CM

## 2020-03-04 DIAGNOSIS — Z90.49 ACQUIRED ABSENCE OF OTHER SPECIFIED PARTS OF DIGESTIVE TRACT: Chronic | ICD-10-CM

## 2020-03-04 DIAGNOSIS — Z98.890 OTHER SPECIFIED POSTPROCEDURAL STATES: Chronic | ICD-10-CM

## 2020-03-04 DIAGNOSIS — Z98.1 ARTHRODESIS STATUS: Chronic | ICD-10-CM

## 2020-03-04 PROCEDURE — 72100 X-RAY EXAM L-S SPINE 2/3 VWS: CPT

## 2020-03-04 PROCEDURE — 72100 X-RAY EXAM L-S SPINE 2/3 VWS: CPT | Mod: 26

## 2020-09-02 ENCOUNTER — OUTPATIENT (OUTPATIENT)
Dept: OUTPATIENT SERVICES | Facility: HOSPITAL | Age: 65
LOS: 1 days | End: 2020-09-02
Payer: COMMERCIAL

## 2020-09-02 DIAGNOSIS — Z98.890 OTHER SPECIFIED POSTPROCEDURAL STATES: Chronic | ICD-10-CM

## 2020-09-02 DIAGNOSIS — Z90.49 ACQUIRED ABSENCE OF OTHER SPECIFIED PARTS OF DIGESTIVE TRACT: Chronic | ICD-10-CM

## 2020-09-02 DIAGNOSIS — Z98.1 ARTHRODESIS STATUS: Chronic | ICD-10-CM

## 2020-09-02 DIAGNOSIS — Z90.89 ACQUIRED ABSENCE OF OTHER ORGANS: Chronic | ICD-10-CM

## 2020-09-02 PROCEDURE — 72100 X-RAY EXAM L-S SPINE 2/3 VWS: CPT | Mod: 26

## 2020-09-02 PROCEDURE — 72100 X-RAY EXAM L-S SPINE 2/3 VWS: CPT

## 2021-04-05 ENCOUNTER — OUTPATIENT (OUTPATIENT)
Dept: OUTPATIENT SERVICES | Facility: HOSPITAL | Age: 66
LOS: 1 days | End: 2021-04-05
Payer: COMMERCIAL

## 2021-04-05 DIAGNOSIS — Z90.49 ACQUIRED ABSENCE OF OTHER SPECIFIED PARTS OF DIGESTIVE TRACT: Chronic | ICD-10-CM

## 2021-04-05 DIAGNOSIS — Z90.89 ACQUIRED ABSENCE OF OTHER ORGANS: Chronic | ICD-10-CM

## 2021-04-05 DIAGNOSIS — Z98.1 ARTHRODESIS STATUS: Chronic | ICD-10-CM

## 2021-04-05 DIAGNOSIS — Z98.890 OTHER SPECIFIED POSTPROCEDURAL STATES: Chronic | ICD-10-CM

## 2021-04-05 PROCEDURE — 72040 X-RAY EXAM NECK SPINE 2-3 VW: CPT

## 2021-04-05 PROCEDURE — 72100 X-RAY EXAM L-S SPINE 2/3 VWS: CPT

## 2021-04-05 PROCEDURE — 72100 X-RAY EXAM L-S SPINE 2/3 VWS: CPT | Mod: 26

## 2021-04-05 PROCEDURE — 72040 X-RAY EXAM NECK SPINE 2-3 VW: CPT | Mod: 26

## 2021-04-12 NOTE — DISCHARGE NOTE ADULT - NS AS DC PROVIDER CONTACT Y/N MULTI
Pt wife called states that she talked to the insurance company and they told her that they would not approve  the patch until pt has tried the pill first. Wife states that the pill would cost them 40 dollars a month. Pt wife wonders if it would help.  Please call her to discuss
Spoke with wife about the oral Exelon pill
Will try exelon pill 1.5 mg bid with meals
Yes

## 2021-10-18 ENCOUNTER — OUTPATIENT (OUTPATIENT)
Dept: OUTPATIENT SERVICES | Facility: HOSPITAL | Age: 66
LOS: 1 days | End: 2021-10-18
Payer: MEDICARE

## 2021-10-18 DIAGNOSIS — Z98.1 ARTHRODESIS STATUS: Chronic | ICD-10-CM

## 2021-10-18 DIAGNOSIS — Z90.89 ACQUIRED ABSENCE OF OTHER ORGANS: Chronic | ICD-10-CM

## 2021-10-18 DIAGNOSIS — Z90.49 ACQUIRED ABSENCE OF OTHER SPECIFIED PARTS OF DIGESTIVE TRACT: Chronic | ICD-10-CM

## 2021-10-18 DIAGNOSIS — Z98.890 OTHER SPECIFIED POSTPROCEDURAL STATES: Chronic | ICD-10-CM

## 2021-10-18 PROCEDURE — 72040 X-RAY EXAM NECK SPINE 2-3 VW: CPT | Mod: 26

## 2021-10-18 PROCEDURE — 72100 X-RAY EXAM L-S SPINE 2/3 VWS: CPT

## 2021-10-18 PROCEDURE — 72100 X-RAY EXAM L-S SPINE 2/3 VWS: CPT | Mod: 26

## 2021-10-18 PROCEDURE — 72040 X-RAY EXAM NECK SPINE 2-3 VW: CPT

## 2022-01-27 RX ORDER — QUINAPRIL HYDROCHLORIDE 40 MG/1
1 TABLET, FILM COATED ORAL
Qty: 0 | Refills: 0 | DISCHARGE

## 2022-01-27 RX ORDER — CANAGLIFLOZIN 100 MG/1
1 TABLET, FILM COATED ORAL
Qty: 0 | Refills: 0 | DISCHARGE

## 2022-01-27 RX ORDER — ASPIRIN/CALCIUM CARB/MAGNESIUM 324 MG
1 TABLET ORAL
Qty: 0 | Refills: 0 | DISCHARGE

## 2022-01-27 RX ORDER — GLIMEPIRIDE 1 MG
1 TABLET ORAL
Qty: 0 | Refills: 0 | DISCHARGE

## 2022-01-27 RX ORDER — NEBIVOLOL HYDROCHLORIDE 5 MG/1
1 TABLET ORAL
Qty: 0 | Refills: 0 | DISCHARGE

## 2022-01-28 ENCOUNTER — INPATIENT (INPATIENT)
Facility: HOSPITAL | Age: 67
LOS: 1 days | Discharge: HOME | DRG: 460 | End: 2022-01-30
Attending: ORTHOPAEDIC SURGERY | Admitting: ORTHOPAEDIC SURGERY
Payer: MEDICARE

## 2022-01-28 ENCOUNTER — TRANSCRIPTION ENCOUNTER (OUTPATIENT)
Age: 67
End: 2022-01-28

## 2022-01-28 VITALS
RESPIRATION RATE: 16 BRPM | DIASTOLIC BLOOD PRESSURE: 77 MMHG | HEART RATE: 75 BPM | OXYGEN SATURATION: 97 % | HEIGHT: 78 IN | WEIGHT: 253.09 LBS | TEMPERATURE: 98 F | SYSTOLIC BLOOD PRESSURE: 163 MMHG

## 2022-01-28 DIAGNOSIS — Z98.890 OTHER SPECIFIED POSTPROCEDURAL STATES: Chronic | ICD-10-CM

## 2022-01-28 DIAGNOSIS — Z90.89 ACQUIRED ABSENCE OF OTHER ORGANS: Chronic | ICD-10-CM

## 2022-01-28 DIAGNOSIS — Z98.1 ARTHRODESIS STATUS: Chronic | ICD-10-CM

## 2022-01-28 DIAGNOSIS — Z90.49 ACQUIRED ABSENCE OF OTHER SPECIFIED PARTS OF DIGESTIVE TRACT: Chronic | ICD-10-CM

## 2022-01-28 LAB
BLD GP AB SCN SERPL QL: NEGATIVE — SIGNIFICANT CHANGE UP
GLUCOSE BLDC GLUCOMTR-MCNC: 166 MG/DL — HIGH (ref 70–99)
GLUCOSE BLDC GLUCOMTR-MCNC: 183 MG/DL — HIGH (ref 70–99)
GLUCOSE BLDC GLUCOMTR-MCNC: 285 MG/DL — HIGH (ref 70–99)
RH IG SCN BLD-IMP: POSITIVE — SIGNIFICANT CHANGE UP

## 2022-01-28 DEVICE — SURGIFOAM PAD 8CM X 12.5CM X 10MM (100): Type: IMPLANTABLE DEVICE | Status: FUNCTIONAL

## 2022-01-28 DEVICE — IMPLANTABLE DEVICE: Type: IMPLANTABLE DEVICE | Status: FUNCTIONAL

## 2022-01-28 DEVICE — GRAFT BONE INFUSE KIT LG II: Type: IMPLANTABLE DEVICE | Status: FUNCTIONAL

## 2022-01-28 DEVICE — SCREW EXP 5.5 VERSE UNITIZED SET: Type: IMPLANTABLE DEVICE | Status: FUNCTIONAL

## 2022-01-28 DEVICE — SURGIFLO HEMOSTATIC MATRIX KIT: Type: IMPLANTABLE DEVICE | Status: FUNCTIONAL

## 2022-01-28 RX ORDER — AMLODIPINE BESYLATE 2.5 MG/1
5 TABLET ORAL DAILY
Refills: 0 | Status: DISCONTINUED | OUTPATIENT
Start: 2022-01-29 | End: 2022-01-30

## 2022-01-28 RX ORDER — CHOLECALCIFEROL (VITAMIN D3) 125 MCG
1 CAPSULE ORAL
Qty: 0 | Refills: 0 | DISCHARGE

## 2022-01-28 RX ORDER — METHOCARBAMOL 500 MG/1
500 TABLET, FILM COATED ORAL EVERY 8 HOURS
Refills: 0 | Status: DISCONTINUED | OUTPATIENT
Start: 2022-01-28 | End: 2022-01-28

## 2022-01-28 RX ORDER — ACETAMINOPHEN 500 MG
650 TABLET ORAL EVERY 6 HOURS
Refills: 0 | Status: DISCONTINUED | OUTPATIENT
Start: 2022-01-28 | End: 2022-01-28

## 2022-01-28 RX ORDER — CYCLOBENZAPRINE HYDROCHLORIDE 10 MG/1
5 TABLET, FILM COATED ORAL THREE TIMES A DAY
Refills: 0 | Status: DISCONTINUED | OUTPATIENT
Start: 2022-01-28 | End: 2022-01-30

## 2022-01-28 RX ORDER — PIOGLITAZONE HYDROCHLORIDE 15 MG/1
1 TABLET ORAL
Qty: 0 | Refills: 0 | DISCHARGE

## 2022-01-28 RX ORDER — HYDROMORPHONE HYDROCHLORIDE 2 MG/ML
0.5 INJECTION INTRAMUSCULAR; INTRAVENOUS; SUBCUTANEOUS
Refills: 0 | Status: DISCONTINUED | OUTPATIENT
Start: 2022-01-28 | End: 2022-01-30

## 2022-01-28 RX ORDER — HYDROMORPHONE HYDROCHLORIDE 2 MG/ML
0.5 INJECTION INTRAMUSCULAR; INTRAVENOUS; SUBCUTANEOUS
Refills: 0 | Status: DISCONTINUED | OUTPATIENT
Start: 2022-01-28 | End: 2022-01-29

## 2022-01-28 RX ORDER — POLYETHYLENE GLYCOL 3350 17 G/17G
17 POWDER, FOR SOLUTION ORAL DAILY
Refills: 0 | Status: DISCONTINUED | OUTPATIENT
Start: 2022-01-28 | End: 2022-01-30

## 2022-01-28 RX ORDER — ACETAMINOPHEN 500 MG
1000 TABLET ORAL ONCE
Refills: 0 | Status: COMPLETED | OUTPATIENT
Start: 2022-01-28 | End: 2022-01-28

## 2022-01-28 RX ORDER — ACETAMINOPHEN 500 MG
1000 TABLET ORAL ONCE
Refills: 0 | Status: DISCONTINUED | OUTPATIENT
Start: 2022-01-28 | End: 2022-01-28

## 2022-01-28 RX ORDER — CHOLECALCIFEROL (VITAMIN D3) 125 MCG
5000 CAPSULE ORAL DAILY
Refills: 0 | Status: DISCONTINUED | OUTPATIENT
Start: 2022-01-28 | End: 2022-01-30

## 2022-01-28 RX ORDER — ATORVASTATIN CALCIUM 80 MG/1
20 TABLET, FILM COATED ORAL AT BEDTIME
Refills: 0 | Status: DISCONTINUED | OUTPATIENT
Start: 2022-01-28 | End: 2022-01-30

## 2022-01-28 RX ORDER — SODIUM CHLORIDE 9 MG/ML
1000 INJECTION, SOLUTION INTRAVENOUS
Refills: 0 | Status: DISCONTINUED | OUTPATIENT
Start: 2022-01-28 | End: 2022-01-30

## 2022-01-28 RX ORDER — DEXTROSE 50 % IN WATER 50 %
12.5 SYRINGE (ML) INTRAVENOUS ONCE
Refills: 0 | Status: DISCONTINUED | OUTPATIENT
Start: 2022-01-28 | End: 2022-01-30

## 2022-01-28 RX ORDER — OXYCODONE HYDROCHLORIDE 5 MG/1
5 TABLET ORAL EVERY 4 HOURS
Refills: 0 | Status: DISCONTINUED | OUTPATIENT
Start: 2022-01-28 | End: 2022-01-28

## 2022-01-28 RX ORDER — ALLOPURINOL 300 MG
1 TABLET ORAL
Qty: 0 | Refills: 0 | DISCHARGE

## 2022-01-28 RX ORDER — DEXTROSE 50 % IN WATER 50 %
25 SYRINGE (ML) INTRAVENOUS ONCE
Refills: 0 | Status: DISCONTINUED | OUTPATIENT
Start: 2022-01-28 | End: 2022-01-30

## 2022-01-28 RX ORDER — HYDROMORPHONE HYDROCHLORIDE 2 MG/ML
0.5 INJECTION INTRAMUSCULAR; INTRAVENOUS; SUBCUTANEOUS EVERY 4 HOURS
Refills: 0 | Status: DISCONTINUED | OUTPATIENT
Start: 2022-01-28 | End: 2022-01-28

## 2022-01-28 RX ORDER — INSULIN LISPRO 100/ML
VIAL (ML) SUBCUTANEOUS
Refills: 0 | Status: DISCONTINUED | OUTPATIENT
Start: 2022-01-28 | End: 2022-01-30

## 2022-01-28 RX ORDER — DEXTROSE 50 % IN WATER 50 %
15 SYRINGE (ML) INTRAVENOUS ONCE
Refills: 0 | Status: DISCONTINUED | OUTPATIENT
Start: 2022-01-28 | End: 2022-01-30

## 2022-01-28 RX ORDER — ONDANSETRON 8 MG/1
4 TABLET, FILM COATED ORAL EVERY 6 HOURS
Refills: 0 | Status: DISCONTINUED | OUTPATIENT
Start: 2022-01-28 | End: 2022-01-30

## 2022-01-28 RX ORDER — BUPIVACAINE 13.3 MG/ML
20 INJECTION, SUSPENSION, LIPOSOMAL INFILTRATION ONCE
Refills: 0 | Status: DISCONTINUED | OUTPATIENT
Start: 2022-01-28 | End: 2022-01-30

## 2022-01-28 RX ORDER — SENNA PLUS 8.6 MG/1
2 TABLET ORAL AT BEDTIME
Refills: 0 | Status: DISCONTINUED | OUTPATIENT
Start: 2022-01-28 | End: 2022-01-30

## 2022-01-28 RX ORDER — NALOXONE HYDROCHLORIDE 4 MG/.1ML
0.1 SPRAY NASAL
Refills: 0 | Status: DISCONTINUED | OUTPATIENT
Start: 2022-01-28 | End: 2022-01-30

## 2022-01-28 RX ORDER — GABAPENTIN 400 MG/1
300 CAPSULE ORAL ONCE
Refills: 0 | Status: COMPLETED | OUTPATIENT
Start: 2022-01-28 | End: 2022-01-28

## 2022-01-28 RX ORDER — LINAGLIPTIN 5 MG/1
1 TABLET, FILM COATED ORAL
Qty: 0 | Refills: 0 | DISCHARGE

## 2022-01-28 RX ORDER — GLUCAGON INJECTION, SOLUTION 0.5 MG/.1ML
1 INJECTION, SOLUTION SUBCUTANEOUS ONCE
Refills: 0 | Status: DISCONTINUED | OUTPATIENT
Start: 2022-01-28 | End: 2022-01-30

## 2022-01-28 RX ORDER — METOPROLOL TARTRATE 50 MG
50 TABLET ORAL DAILY
Refills: 0 | Status: DISCONTINUED | OUTPATIENT
Start: 2022-01-28 | End: 2022-01-30

## 2022-01-28 RX ORDER — POVIDONE-IODINE 5 %
1 AEROSOL (ML) TOPICAL ONCE
Refills: 0 | Status: COMPLETED | OUTPATIENT
Start: 2022-01-28 | End: 2022-01-28

## 2022-01-28 RX ORDER — HYDROMORPHONE HYDROCHLORIDE 2 MG/ML
30 INJECTION INTRAMUSCULAR; INTRAVENOUS; SUBCUTANEOUS
Refills: 0 | Status: DISCONTINUED | OUTPATIENT
Start: 2022-01-28 | End: 2022-01-29

## 2022-01-28 RX ORDER — APIXABAN 2.5 MG/1
2.5 TABLET, FILM COATED ORAL EVERY 12 HOURS
Refills: 0 | Status: DISCONTINUED | OUTPATIENT
Start: 2022-01-29 | End: 2022-01-30

## 2022-01-28 RX ORDER — VANCOMYCIN HCL 1 G
1000 VIAL (EA) INTRAVENOUS EVERY 12 HOURS
Refills: 0 | Status: COMPLETED | OUTPATIENT
Start: 2022-01-28 | End: 2022-01-29

## 2022-01-28 RX ORDER — APREPITANT 80 MG/1
40 CAPSULE ORAL ONCE
Refills: 0 | Status: COMPLETED | OUTPATIENT
Start: 2022-01-28 | End: 2022-01-28

## 2022-01-28 RX ORDER — LISINOPRIL 2.5 MG/1
40 TABLET ORAL DAILY
Refills: 0 | Status: DISCONTINUED | OUTPATIENT
Start: 2022-01-29 | End: 2022-01-30

## 2022-01-28 RX ORDER — ALLOPURINOL 300 MG
300 TABLET ORAL DAILY
Refills: 0 | Status: DISCONTINUED | OUTPATIENT
Start: 2022-01-28 | End: 2022-01-30

## 2022-01-28 RX ADMIN — GABAPENTIN 300 MILLIGRAM(S): 400 CAPSULE ORAL at 07:07

## 2022-01-28 RX ADMIN — HYDROMORPHONE HYDROCHLORIDE 30 MILLILITER(S): 2 INJECTION INTRAMUSCULAR; INTRAVENOUS; SUBCUTANEOUS at 13:26

## 2022-01-28 RX ADMIN — Medication 1 APPLICATION(S): at 06:50

## 2022-01-28 RX ADMIN — Medication 250 MILLIGRAM(S): at 21:00

## 2022-01-28 RX ADMIN — Medication 50 MILLIGRAM(S): at 17:49

## 2022-01-28 RX ADMIN — Medication 1000 MILLIGRAM(S): at 07:07

## 2022-01-28 RX ADMIN — ATORVASTATIN CALCIUM 20 MILLIGRAM(S): 80 TABLET, FILM COATED ORAL at 22:24

## 2022-01-28 RX ADMIN — APREPITANT 40 MILLIGRAM(S): 80 CAPSULE ORAL at 07:07

## 2022-01-28 NOTE — H&P ADULT - PROBLEM SELECTOR PLAN 1
Admit to Orthopaedic Service.  Presents today for elective  PSF/Laminectomy T6-T9 with instrumentation and bone graft.  Pt medically stable and cleared for procedure today by Dr. Smith and Dr. Velasco.

## 2022-01-28 NOTE — CONSULT NOTE ADULT - ASSESSMENT
POD nu 0  s/p T spine  OOB  PT  VTE: SCD  Diet:  FSBS: 183/166    h/o DVT/PE--not active  ON chronic use of Eliquis, will resume when cleared by spine  DM FSBS--resume oral agent , will discuss

## 2022-01-28 NOTE — BRIEF OPERATIVE NOTE - NSICDXBRIEFPROCEDURE_GEN_ALL_CORE_FT
PROCEDURES:  Fusion, spine, thoracic, posterior approach with instrumentation 28-Jan-2022 07:13:55  Shlomo Bishop

## 2022-01-28 NOTE — PACU DISCHARGE NOTE - NS MD DISCHARGE NOTE DISCHARGE
Today October 24, 2019 you received the    Typhoid - injectable. This vaccine is valid for two years.   .  HEP  A #2    These appointments can be made as a NURSE ONLY visit.    **It is very important for the vaccinations to be given on the scheduled day(s), this helps ensure you receive the full effectiveness of the vaccine.**    Please call [facility] with any questions [phone]    Thank you for visiting [facility]         Cardiac Tele

## 2022-01-28 NOTE — DISCHARGE NOTE PROVIDER - NSDCMRMEDTOKEN_GEN_ALL_CORE_FT
allopurinol 300 mg oral tablet: 1 tab(s) orally once a day  amLODIPine 5 mg oral tablet: 1 tab(s) orally once a day  Eliquis 2.5 mg oral tablet: 1 tab(s) orally 2 times a day  glipiZIDE 10 mg oral tablet: orally 2 times a day  Jardiance 25 mg oral tablet: 1 tab(s) orally once a day (in the morning)  metoprolol succinate 50 mg oral capsule, extended release: 1 cap(s) orally once a day  oxycodone-acetaminophen 5 mg-325 mg oral tablet: 1-2 tab(s) orally every 4- 6 hours, As Needed -for severe pain MDD:12   pioglitazone 30 mg oral tablet: 1 tab(s) orally once a day  pravastatin 80 mg oral tablet: 1 tab(s) orally once a day  quinapril 40 mg oral tablet: 1 tab(s) orally once a day  Tradjenta 5 mg oral tablet: 1 tab(s) orally once a day  Vitamin D3 5000 intl units oral capsule: 1 cap(s) orally once a day   allopurinol 300 mg oral tablet: 1 tab(s) orally once a day  amLODIPine 5 mg oral tablet: 1 tab(s) orally once a day  cyclobenzaprine 5 mg oral tablet: 1 tab(s) orally 3 times a day, As needed, Muscle Spasm MDD:3  Eliquis 2.5 mg oral tablet: 1 tab(s) orally 2 times a day  glipiZIDE 10 mg oral tablet: orally 2 times a day  Jardiance 25 mg oral tablet: 1 tab(s) orally once a day (in the morning)  metoprolol succinate 50 mg oral capsule, extended release: 1 cap(s) orally once a day  oxycodone-acetaminophen 5 mg-325 mg oral tablet: 1-2 tab(s) orally every 4- 6 hours, As Needed -for severe pain MDD:12   pioglitazone 30 mg oral tablet: 1 tab(s) orally once a day  pravastatin 80 mg oral tablet: 1 tab(s) orally once a day  quinapril 40 mg oral tablet: 1 tab(s) orally once a day  senna oral tablet: 2 tab(s) orally once a day (at bedtime)  Tradjenta 5 mg oral tablet: 1 tab(s) orally once a day  Vitamin D3 5000 intl units oral capsule: 1 cap(s) orally once a day   allopurinol 300 mg oral tablet: 1 tab(s) orally once a day  amLODIPine 5 mg oral tablet: 1 tab(s) orally once a day  cyclobenzaprine 5 mg oral tablet: 1 tab(s) orally 3 times a day, As needed, Muscle Spasm MDD:3  Eliquis 2.5 mg oral tablet: 1 tab(s) orally 2 times a day  glipiZIDE 10 mg oral tablet: orally 2 times a day  Jardiance 25 mg oral tablet: 1 tab(s) orally once a day (in the morning)  metoprolol succinate 50 mg oral capsule, extended release: 1 cap(s) orally once a day  oxyCODONE 5 mg oral tablet: 1-2  tab(s) orally every 4 to 6 hours, As Needed -for moderateto severe  pain MDD:6   pioglitazone 30 mg oral tablet: 1 tab(s) orally once a day  pravastatin 80 mg oral tablet: 1 tab(s) orally once a day  quinapril 40 mg oral tablet: 1 tab(s) orally once a day  senna oral tablet: 2 tab(s) orally once a day (at bedtime)  Tradjenta 5 mg oral tablet: 1 tab(s) orally once a day  Vitamin D3 5000 intl units oral capsule: 1 cap(s) orally once a day   allopurinol 300 mg oral tablet: 1 tab(s) orally once a day  amLODIPine 5 mg oral tablet: 1 tab(s) orally once a day  cyclobenzaprine 5 mg oral tablet: 1 tab(s) orally 3 times a day MDD:3  Eliquis 2.5 mg oral tablet: 1 tab(s) orally 2 times a day  glipiZIDE 10 mg oral tablet: orally 2 times a day  Jardiance 25 mg oral tablet: 1 tab(s) orally once a day (in the morning)  metoprolol succinate 50 mg oral capsule, extended release: 1 cap(s) orally once a day  oxyCODONE 5 mg oral tablet: 1 tab(s) orally every 4 hours MDD:6  pioglitazone 30 mg oral tablet: 1 tab(s) orally once a day  pravastatin 80 mg oral tablet: 1 tab(s) orally once a day  quinapril 40 mg oral tablet: 1 tab(s) orally once a day  senna oral tablet: 2 tab(s) orally once a day (at bedtime) MDD:2  Tradjenta 5 mg oral tablet: 1 tab(s) orally once a day  Vitamin D3 5000 intl units oral capsule: 1 cap(s) orally once a day

## 2022-01-28 NOTE — DISCHARGE NOTE PROVIDER - HOSPITAL COURSE
Admitted  Surgery PSF/LAMI T6-T9  Rere-op Antibiotics  Pain control  DVT prophylaxis  OOB/Physical Therapy

## 2022-01-28 NOTE — PROGRESS NOTE ADULT - SUBJECTIVE AND OBJECTIVE BOX
Orthopedics Post Op Check    Procedure: PSF/Laminectomy T6-T9  Surgeon: Main    Pt requests his dentures and glasses, otherwise no other complaints.  Denies any SOB, CP, nausea/vomiting, numbness or tingling of b/l UES.     Vital Signs Last 24 Hrs  T(C): 36.5 (28 Jan 2022 06:31), Max: 36.5 (28 Jan 2022 06:31)  T(F): 97.7 (28 Jan 2022 06:31), Max: 97.7 (28 Jan 2022 06:31)  HR: 62 (28 Jan 2022 12:22) (57 - 96)  BP: 141/73 (28 Jan 2022 12:22) (119/61 - 163/77)  BP(mean): 98 (28 Jan 2022 12:22) (85 - 98)  RR: 13 (28 Jan 2022 12:22) (13 - 16)  SpO2: 100% (28 Jan 2022 12:22) (97% - 100%)      Aquacel Dressing C/D/I    Pulses: Brachial/Radial 2+  SLT: intact b/l UES  Motor:  5/5 b/l UES  Finger Int 5/5 b/l UES  Wrist flexion/ext 5/5 b/l UES  Biceps/triceps/Delts 5/5 b/l UES      Post op XR: screws in place    A/P: 66yoMale POD#0 s/p PSF/Laminectomy T6-T9  - Stable  - Pain Control  - DVT ppx: SCDs  - Post op abx: Vancomycin 1000mg IV infusion q12hrs, stop after 3 doses  - PT, WBS: WBAT  - F/U AM Labs Orthopedics Post Op Check    Procedure: PSF/Laminectomy T6-T9  Surgeon: Main    Pt, requests his dentures and glasses, otherwise no other complaints.  Denies any SOB, CP, nausea/vomiting, numbness or tingling of b/l UES.     Vital Signs Last 24 Hrs  T(C): 36.5 (28 Jan 2022 06:31), Max: 36.5 (28 Jan 2022 06:31)  T(F): 97.7 (28 Jan 2022 06:31), Max: 97.7 (28 Jan 2022 06:31)  HR: 62 (28 Jan 2022 12:22) (57 - 96)  BP: 141/73 (28 Jan 2022 12:22) (119/61 - 163/77)  BP(mean): 98 (28 Jan 2022 12:22) (85 - 98)  RR: 13 (28 Jan 2022 12:22) (13 - 16)  SpO2: 100% (28 Jan 2022 12:22) (97% - 100%)      Dressing C/D/I    Pulses: Brachial/Radial 2+  SLT: intact b/l UES  Motor:  5/5 b/l UES  Finger Int 5/5 b/l UES  Wrist flexion/ext 5/5 b/l UES  Biceps/triceps/Delts 5/5 b/l UES      A/P: 66yoMale POD#0 s/p PSF/Laminectomy T6-T9  - Stable  - Pain Control  - DVT ppx: SCDs  - Post op abx: Vancomycin 1000mg IV infusion q12hrs, stop after 3 doses  - PT, WBS: WBAT  - F/U AM Labs    Pt. seen and examined with RADHA. Agree and edited above note.   Gladys [facility]

## 2022-01-28 NOTE — H&P ADULT - HISTORY OF PRESENT ILLNESS
65yo m c/o low back pain x 1 year. Denies preceding trauma/injury. Pt has hx of cervical and lumbar fusion, last surgery in 2019. Pt reports his symptoms to be right sided chest wall/rib pain that radiates to his back. He denies new onset numbness/tingling/weakness of bilateral lower extremities. Pt takes oxycodone 5 PRN for pain. He does not ambulate with an assistive device at baseline. Pt reports DVT/PE hx in 2019 following his last surgery for which he is currently on Eliquis (held Sunday 1/23 for today's procedure). Pt denies chest pain, SOB, N/V, tactile fevers, calf pain.  Presents today for elective PSF/Laminectomy T6-T9 with instrumentation and bone graft.

## 2022-01-28 NOTE — DISCHARGE NOTE PROVIDER - NSDCFUADDINST_GEN_ALL_CORE_FT
No strenuous activity (bending/twisting), heavy lifting, driving or returning to work until cleared by MD.  Change dressing daily with gauze/tape till post-op day #5, then leave incision open to air.  You may shower post-op day#5, keep incision clean and dry.   Try to have regular bowel movements, take stool softener or laxative if necessary.  May take pepcid or zantac for upset stomach.  Call to schedule an appt with Dr. uW for follow up, if you have staples or sutures they will be removed in office.  Contact your doctor if you experience: fever greater than 101.5, chills, chest pain, difficulty breathing, redness or excessive drainage around the incision, other concerns.   No strenuous activity (bending/twisting), heavy lifting, driving or returning to work until cleared by MD.  Change dressing daily with gauze/tape till post-op day #5, then leave incision open to air.  You may shower, keep incision clean and dry.   Try to have regular bowel movements, take stool softener or laxative if necessary.  May take pepcid or zantac for upset stomach.  Call to schedule an appt with Dr. Wu for follow up, if you have staples or sutures they will be removed in office.  Contact your doctor if you experience: fever greater than 101.5, chills, chest pain, difficulty breathing, redness or excessive drainage around the incision, other concerns.

## 2022-01-28 NOTE — H&P ADULT - NSICDXPASTMEDICALHX_GEN_ALL_CORE_FT
PAST MEDICAL HISTORY:  Arthritis     Diabetes     Gout     High cholesterol     History of DVT in adulthood     History of DVT of lower extremity     Hypertension     Personal history of PE (pulmonary embolism)

## 2022-01-28 NOTE — PATIENT PROFILE ADULT - FALL HARM RISK - UNIVERSAL INTERVENTIONS
Bed in lowest position, wheels locked, appropriate side rails in place/Call bell, personal items and telephone in reach/Instruct patient to call for assistance before getting out of bed or chair/Non-slip footwear when patient is out of bed/[place] to call system/Physically safe environment - no spills, clutter or unnecessary equipment/Purposeful Proactive Rounding/Room/bathroom lighting operational, light cord in reach

## 2022-01-28 NOTE — DISCHARGE NOTE PROVIDER - NSDCCPTREATMENT_GEN_ALL_CORE_FT
PRINCIPAL PROCEDURE  Procedure: Fusion, spine, thoracic, posterior approach with instrumentation  Findings and Treatment: T6-T9

## 2022-01-28 NOTE — H&P ADULT - NSHPLABSRESULTS_GEN_ALL_CORE
preop cbc/bmp/coags/ua wnl per medical clearance   Cr 1.47 (hx of ckd)   Preop EKG wnl per clearance   Preop chest x-ray wnl per clearance  covid negative 1/25  Spirometry- mild airway obstruction

## 2022-01-28 NOTE — H&P ADULT - NSICDXPASTSURGICALHX_GEN_ALL_CORE_FT
PAST SURGICAL HISTORY:  History of appendectomy 1968    History of lumbar fusion with hardware ( 80687    History of surgery cervical fusion with hardware (2009)    History of tonsillectomy childhood

## 2022-01-28 NOTE — H&P ADULT - PROBLEM/PLAN-6
Akhil Gómez ([facility]) [phone] [facility]     Lab results DISPLAY PLAN FREE TEXT

## 2022-01-28 NOTE — DISCHARGE NOTE PROVIDER - CARE PROVIDER_API CALL
Jakob Wu)  Orthopaedic Surgery; Spine Surgery  [address] [place] [address]  Phone: [phone]  Fax: [phone]  Follow Up Time:

## 2022-01-28 NOTE — CONSULT NOTE ADULT - SUBJECTIVE AND OBJECTIVE BOX
Pain Management Consult Note - [facility] [phone]    Chief Complaint: low back pain     HPI: Patient seen and examined today. This is a 65 y/o male PMH of HCL, HTN, DM, gout, hx of DVT s/p PSF/laminectomy T6-T9 with Dr. Wu. Patient reports endorsing back pain that increased over the past year. Patient reports hx of cervical and lumbar surgeries, last in 2019. Patient reports low back pain does not radiate down the bilateral lower extremities, reports no numbness/tingling down the extremities. At home, patient reports taking Oxycodone 5 mg po 1-2x/day for pain, patient istop positive for temazepam 30 mg PO daily and Percocet 5/325 mg PO q4h prn prescribed by Dr. Wu. Post operatively, patient reports pain controlled, currently rated to be a 4-5 out of 10. Patient reports he does not like Tylenol as it gives him GI upset.     Pain is _X__ sharp ____dull ___burning ___achy ___ Intensity: ____ mild __X_mod ___severe     Location ___X_surgical site ____cervical __X___lumbar ____abd ____upper ext____lower ext    Worse with __X__activity __X__movement _____physical therapy___ Rest    Improved with _X___medication __X__rest ____physical therapy    ROS: Const:  N___febrile   Eyes:___ENT:___CV: ___chest pain  Resp: _N___sob  GI:__N_nausea _N__vomiting _N__abd pain __Y_npo ___clears __full diet __bm  :___ Musk: __Y_pain ___spasm  Skin:___ Neuro:  _N__sedation__N_confusion___N numbness ___weakness N___paresth  Psych:__Nanxiety  Endo:___ Heme:___Allergy:_NKDA________, __Y_all others reviewed and negative    PAST MEDICAL & SURGICAL HISTORY:  High cholesterol  Hypertension  Diabetes  Arthritis  Gout  Personal history of PE (pulmonary embolism)  History of DVT in adulthood  History of DVT of lower extremity  History of tonsillectomy  childhood  History of appendectomy  1968  History of lumbar fusion  with hardware ( 45[address]  History of surgery  cervical fusion with hardware (2009)    SH: _Y__Tobacco   __N_Alcohol                          FH:FAMILY HISTORY:  Family history of diabetes mellitus in first degree relative (Aunt)    allopurinol 300 milliGRAM(s) Oral daily  atorvastatin 20 milliGRAM(s) Oral at bedtime  bisacodyl Suppository 10 milliGRAM(s) Rectal daily PRN  BUpivacaine liposome 1.3% Injectable (no eMAR) 20 milliLiter(s) Local Injection once  cholecalciferol 5000 Unit(s) Oral daily  cyclobenzaprine 5 milliGRAM(s) Oral three times a day PRN  dextrose 40% Gel 15 Gram(s) Oral once  dextrose 5%. 1000 milliLiter(s) IV Continuous <Continuous>  dextrose 5%. 1000 milliLiter(s) IV Continuous <Continuous>  dextrose 50% Injectable 25 Gram(s) IV Push once  dextrose 50% Injectable 12.5 Gram(s) IV Push once  dextrose 50% Injectable 25 Gram(s) IV Push once  glucagon  Injectable 1 milliGRAM(s) IntraMuscular once  HYDROmorphone  Injectable 0.5 milliGRAM(s) IV Push every 15 minutes PRN  HYDROmorphone PCA (1 mG/mL) 30 milliLiter(s) PCA Continuous PCA Continuous  HYDROmorphone PCA (1 mG/mL) Rescue Clinician Bolus 0.5 milliGRAM(s) IV Push every 2 hours PRN  insulin lispro (ADMELOG) corrective regimen sliding scale   SubCutaneous Before meals and at bedtime  lactated ringers. 1000 milliLiter(s) IV Continuous <Continuous>  metoprolol succinate ER 50 milliGRAM(s) Oral daily  naloxone Injectable 0.1 milliGRAM(s) IV Push every 3 minutes PRN  ondansetron Injectable 4 milliGRAM(s) IV Push every 6 hours PRN  polyethylene glycol 3350 17 Gram(s) Oral daily  senna 2 Tablet(s) Oral at bedtime  vancomycin  IVPB 1000 milliGRAM(s) IV Intermittent every 12 hours      T(C): 36.5 (01-28-22 @ 06:31), Max: 36.5 (01-28-22 @ 06:31)  HR: 62 (01-28-22 @ 12:22) (57 - 96)  BP: 141/73 (01-28-22 @ 12:22) (119/61 - 163/77)  RR: 13 (01-28-22 @ 12:22) (13 - 16)  SpO2: 100% (01-28-22 @ 12:22) (97% - 100%)  Wt(kg): --    T(C): 36.5 (01-28-22 @ 06:31), Max: 36.5 (01-28-22 @ 06:31)  HR: 62 (01-28-22 @ 12:22) (57 - 96)  BP: 141/73 (01-28-22 @ 12:22) (119/61 - 163/77)  RR: 13 (01-28-22 @ 12:22) (13 - 16)  SpO2: 100% (01-28-22 @ 12:22) (97% - 100%)  Wt(kg): --    T(C): 36.5 (01-28-22 @ 06:31), Max: 36.5 (01-28-22 @ 06:31)  HR: 62 (01-28-22 @ 12:22) (57 - 96)  BP: 141/73 (01-28-22 @ 12:22) (119/61 - 163/77)  RR: 13 (01-28-22 @ 12:22) (13 - 16)  SpO2: 100% (01-28-22 @ 12:22) (97% - 100%)  Wt(kg): --    PHYSICAL EXAM:  Gen Appearance: __X_no acute distress __X_appropriate        Neuro: ___SILT feet____ EOM Intact Psych: AAOX_3_, __X_mood/affect appropriate        Eyes: _X__conjunctiva WNL  __X___ Pupils equal and round        ENT: __X_ears and nose atraumatic__X_ Hearing grossly intact        Neck: _X__trachea midline, no visible masses ___thyroid without palpable mass    Resp: _X__Nml WOB____No tactile fremitus ___clear to auscultation    Cardio: _X__extremities free from edema ____pedal pulses palpable    GI/Abdomen: ___soft _____ Nontender____X__Nondistended_____HSM    Lymphatic: ___no palpable nodes in neck  ___no palpable nodes calves and feet    Skin/Wound: ___Incision, _X__Dressing c/d/i,   ____surrounding tissues soft,  X___drain/chest tube present____    Muscular: EHL _5__/5  Gastrocnemius__5_/5    _X__absent clubbing/cyanosis      ASSESSMENT: This is a 66y old Male with a history of HCL, HTN, DM, gout, hx of DVT s/p PSF/laminectomy T6-T9 with Dr. Wu.    Recommended Treatment PLAN:  1. Dilaudid PCA 0.2 mg [id number], 8 mg 4 hr max; Dilaudid 0.5 mg 2hr rescue clinician bolus  2. Patient with reports of GI distress with Tylenol. D/C Tylenol 975 mg PO TID  3. Flexeril 5 mg PO q8h PRN muscle spasm  4. Considering discontinuing Dilaudid PCA tomorrow (1/28) and transitioning to PO pain regimen: Oxycodone 5-10 mg PO q4h PRN moderate-severe pain, Dilaudid 0.5 mg IVP q4h PRN breakthrough pain  Plan discussed with Dr. Horton             
JUAN ZAVALA      Patient is a 66y old  Male who presents with a chief complaint of     HPI:  65yo m c/o low back pain x 1 year. Denies preceding trauma/injury. Pt has hx of cervical and lumbar fusion, last surgery in 2019. Pt reports his symptoms to be right sided chest wall/rib pain that radiates to his back. He denies new onset numbness/tingling/weakness of bilateral lower extremities. Pt takes oxycodone 5 PRN for pain. He does not ambulate with an assistive device at baseline. Pt reports DVT/PE hx in 2019 following his last surgery for which he is currently on Eliquis (held Sunday 1/23 for today's procedure). Pt denies chest pain, SOB, N/V, tactile fevers, calf pain.  Presents today for elective PSF/Laminectomy T6-T9 with instrumentation and bone graft.  (28 Jan 2022 07:22)      Addl  Medical issues:       HEALTH ISSUES - PROBLEM Dx:            MEDICATIONS  (STANDING):  allopurinol 300 milliGRAM(s) Oral daily  atorvastatin 20 milliGRAM(s) Oral at bedtime  BUpivacaine liposome 1.3% Injectable (no eMAR) 20 milliLiter(s) Local Injection once  cholecalciferol 5000 Unit(s) Oral daily  dextrose 40% Gel 15 Gram(s) Oral once  dextrose 5%. 1000 milliLiter(s) (50 mL/Hr) IV Continuous <Continuous>  dextrose 5%. 1000 milliLiter(s) (100 mL/Hr) IV Continuous <Continuous>  dextrose 50% Injectable 25 Gram(s) IV Push once  dextrose 50% Injectable 12.5 Gram(s) IV Push once  dextrose 50% Injectable 25 Gram(s) IV Push once  glucagon  Injectable 1 milliGRAM(s) IntraMuscular once  HYDROmorphone PCA (1 mG/mL) 30 milliLiter(s) PCA Continuous PCA Continuous  insulin lispro (ADMELOG) corrective regimen sliding scale   SubCutaneous Before meals and at bedtime  lactated ringers. 1000 milliLiter(s) (60 mL/Hr) IV Continuous <Continuous>  metoprolol succinate ER 50 milliGRAM(s) Oral daily  polyethylene glycol 3350 17 Gram(s) Oral daily  senna 2 Tablet(s) Oral at bedtime  vancomycin  IVPB 1000 milliGRAM(s) IV Intermittent every 12 hours    MEDICATIONS  (PRN):  bisacodyl Suppository 10 milliGRAM(s) Rectal daily PRN Constipation  cyclobenzaprine 5 milliGRAM(s) Oral three times a day PRN Muscle Spasm  HYDROmorphone  Injectable 0.5 milliGRAM(s) IV Push every 15 minutes PRN breakthrough pain  HYDROmorphone PCA (1 mG/mL) Rescue Clinician Bolus 0.5 milliGRAM(s) IV Push every 2 hours PRN for Pain Scale GREATER THAN 6  naloxone Injectable 0.1 milliGRAM(s) IV Push every 3 minutes PRN For ANY of the following changes in patient status:  A. RR LESS THAN 10 breaths per minute, B. Oxygen saturation LESS THAN 90%, C. Sedation score of 6  ondansetron Injectable 4 milliGRAM(s) IV Push every 6 hours PRN Nausea  Home Medications:  allopurinol 300 mg oral tablet: 1 tab(s) orally once a day (28 Jan 2022 07:42)  amLODIPine 5 mg oral tablet: 1 tab(s) orally once a day (28 Jan 2022 07:42)  Eliquis 2.5 mg oral tablet: 1 tab(s) orally 2 times a day (28 Jan 2022 07:42)  glipiZIDE 10 mg oral tablet: orally 2 times a day (28 Jan 2022 07:42)  Jardiance 25 mg oral tablet: 1 tab(s) orally once a day (in the morning) (28 Jan 2022 07:42)  metoprolol succinate 50 mg oral capsule, extended release: 1 cap(s) orally once a day (28 Jan 2022 07:42)  pioglitazone 30 mg oral tablet: 1 tab(s) orally once a day (28 Jan 2022 07:42)  pravastatin 80 mg oral tablet: 1 tab(s) orally once a day (28 Jan 2022 07:42)  quinapril 40 mg oral tablet: 1 tab(s) orally once a day (28 Jan 2022 07:42)  Tradjenta 5 mg oral tablet: 1 tab(s) orally once a day (28 Jan 2022 07:42)  Vitamin D3 5000 intl units oral capsule: 1 cap(s) orally once a day (28 Jan 2022 07:42)          PAST MEDICAL & SURGICAL HISTORY:  High cholesterol    Hypertension    Diabetes    Arthritis    Gout    Personal history of PE (pulmonary embolism)    History of DVT in adulthood    History of DVT of lower extremity    History of tonsillectomy  childhood    History of appendectomy  1968    History of lumbar fusion  with hardware ( 36623    History of surgery  cervical fusion with hardware (2009)        REVIEW OF SYSTEMS:  [x] As per HPI          Reviewed   no change                            Changes noted  CONSTITUTIONAL: No fever, weight loss, or fatigue  RESPIRATORY: No cough, wheezing, chills or hemoptysis; No Shortness of Breath  CARDIOVASCULAR: No chest pain, palpitations, dizziness, or leg swelling  GASTROINTESTINAL: No abdominal or epigastric pain. No nausea, vomiting, or hematemesis; No diarrhea or constipation. No melena or hematochezia.  MUSCULOSKELETAL: No joint pain or swelling; No muscle, back, T spine  Neuro:   Grossly  Negative  Psych        Awake  alert  [x] All others negative	  [ ] Unable to obtain      Vital Signs Last 24 Hrs  T(C): 36.5 (28 Jan 2022 06:31), Max: 36.5 (28 Jan 2022 06:31)  T(F): 97.7 (28 Jan 2022 06:31), Max: 97.7 (28 Jan 2022 06:31)  HR: 88 (28 Jan 2022 18:50) (57 - 98)  BP: 137/75 (28 Jan 2022 18:50) (119/61 - 172/81)  BP(mean): 116 (28 Jan 2022 17:10) (85 - 116)  RR: 17 (28 Jan 2022 17:10) (13 - 19)  SpO2: 98% (28 Jan 2022 17:10) (97% - 100%)    PHYSICAL EXAM:      Constitutional:  PHYSICAL EXAM:      Constitutional: NAD, well-groomed, well-developed  HEENT: PERRLA, EOMI, Normal Hearing, MMM  Neck: No LAD, No JVD  Back: Normal spine flexure, No CVA tenderness  Respiratory: CTABCardiovascular: S1 and S2, RRR, no M/G/R  Gastrointestinal: BS+, soft, NT/ND  Extremities: No peripheral edema  Vascular: 2+ peripheral pulses  Neurological: A/O x 3, no focal deficits  Psychiatric: Normal mood, normal affect  Musculoskeletal: 5/5 strength b/l upper and lower extremities  Skin: No rashes                        CAPILLARY BLOOD GLUCOSE      POCT Blood Glucose.: 183 mg/dL (28 Jan 2022 17:55)  POCT Blood Glucose.: 166 mg/dL (28 Jan 2022 06:57)      I&O's Summary    28 Jan 2022 07:01  -  28 Jan 2022 20:47  --------------------------------------------------------  IN: 300 mL / OUT: 1330 mL / NET: -1030 mL            ASSESSMENT/PLAN/RECOMMENDATIONS

## 2022-01-28 NOTE — H&P ADULT - NSICDXFAMILYHX_GEN_ALL_CORE_FT
FAMILY HISTORY:  Aunt  Still living? No  Family history of diabetes mellitus in first degree relative, Age at diagnosis: Age Unknown

## 2022-01-28 NOTE — H&P ADULT - NSHPPHYSICALEXAM_GEN_ALL_CORE
MSK: Decreased ROM secondary to pain, thoracic spine   Skin warm and well perfused, no visible wounds/erythema/ecchymoses  EHL/FHL/TA/GS 5/5 motor strength bilaterally   SLT in tact to distal bilateral lower extremities   DP pulses palpable bilaterally   Remainder of PE per medical clearance.

## 2022-01-28 NOTE — PACU DISCHARGE NOTE - COMMENTS
Met PACU criteria for transfer to [address] [place] [address], transferred via bed monitored, endorsed to JG Farrar.

## 2022-01-29 LAB
A1C WITH ESTIMATED AVERAGE GLUCOSE RESULT: 7.2 % — HIGH (ref 4–5.6)
ANION GAP SERPL CALC-SCNC: 12 MMOL/L — SIGNIFICANT CHANGE UP (ref 5–17)
BASOPHILS # BLD AUTO: 0.03 K/UL — SIGNIFICANT CHANGE UP (ref 0–0.2)
BASOPHILS NFR BLD AUTO: 0.2 % — SIGNIFICANT CHANGE UP (ref 0–2)
BUN SERPL-MCNC: 25 MG/DL — HIGH (ref 7–23)
CALCIUM SERPL-MCNC: 9.3 MG/DL — SIGNIFICANT CHANGE UP (ref 8.4–10.5)
CHLORIDE SERPL-SCNC: 104 MMOL/L — SIGNIFICANT CHANGE UP (ref 96–108)
CO2 SERPL-SCNC: 23 MMOL/L — SIGNIFICANT CHANGE UP (ref 22–31)
CREAT SERPL-MCNC: 1.26 MG/DL — SIGNIFICANT CHANGE UP (ref 0.5–1.3)
EOSINOPHIL # BLD AUTO: 0 K/UL — SIGNIFICANT CHANGE UP (ref 0–0.5)
EOSINOPHIL NFR BLD AUTO: 0 % — SIGNIFICANT CHANGE UP (ref 0–6)
ESTIMATED AVERAGE GLUCOSE: 160 MG/DL — HIGH (ref 68–114)
GLUCOSE BLDC GLUCOMTR-MCNC: 154 MG/DL — HIGH (ref 70–99)
GLUCOSE BLDC GLUCOMTR-MCNC: 230 MG/DL — HIGH (ref 70–99)
GLUCOSE BLDC GLUCOMTR-MCNC: 246 MG/DL — HIGH (ref 70–99)
GLUCOSE BLDC GLUCOMTR-MCNC: 264 MG/DL — HIGH (ref 70–99)
GLUCOSE SERPL-MCNC: 190 MG/DL — HIGH (ref 70–99)
HCT VFR BLD CALC: 44.7 % — SIGNIFICANT CHANGE UP (ref 39–50)
HCV AB S/CO SERPL IA: 0.04 S/CO — SIGNIFICANT CHANGE UP
HCV AB SERPL-IMP: SIGNIFICANT CHANGE UP
HGB BLD-MCNC: 14.7 G/DL — SIGNIFICANT CHANGE UP (ref 13–17)
IMM GRANULOCYTES NFR BLD AUTO: 0.4 % — SIGNIFICANT CHANGE UP (ref 0–1.5)
LYMPHOCYTES # BLD AUTO: 1.83 K/UL — SIGNIFICANT CHANGE UP (ref 1–3.3)
LYMPHOCYTES # BLD AUTO: 12.4 % — LOW (ref 13–44)
MCHC RBC-ENTMCNC: 32.3 PG — SIGNIFICANT CHANGE UP (ref 27–34)
MCHC RBC-ENTMCNC: 32.9 GM/DL — SIGNIFICANT CHANGE UP (ref 32–36)
MCV RBC AUTO: 98.2 FL — SIGNIFICANT CHANGE UP (ref 80–100)
MONOCYTES # BLD AUTO: 1.31 K/UL — HIGH (ref 0–0.9)
MONOCYTES NFR BLD AUTO: 8.9 % — SIGNIFICANT CHANGE UP (ref 2–14)
NEUTROPHILS # BLD AUTO: 11.55 K/UL — HIGH (ref 1.8–7.4)
NEUTROPHILS NFR BLD AUTO: 78.1 % — HIGH (ref 43–77)
NRBC # BLD: 0 /100 WBCS — SIGNIFICANT CHANGE UP (ref 0–0)
PLATELET # BLD AUTO: 153 K/UL — SIGNIFICANT CHANGE UP (ref 150–400)
POTASSIUM SERPL-MCNC: 4.6 MMOL/L — SIGNIFICANT CHANGE UP (ref 3.5–5.3)
POTASSIUM SERPL-SCNC: 4.6 MMOL/L — SIGNIFICANT CHANGE UP (ref 3.5–5.3)
RBC # BLD: 4.55 M/UL — SIGNIFICANT CHANGE UP (ref 4.2–5.8)
RBC # FLD: 14.2 % — SIGNIFICANT CHANGE UP (ref 10.3–14.5)
SODIUM SERPL-SCNC: 139 MMOL/L — SIGNIFICANT CHANGE UP (ref 135–145)
VANCOMYCIN TROUGH SERPL-MCNC: <4 UG/ML — SIGNIFICANT CHANGE UP (ref 10–20)
WBC # BLD: 14.78 K/UL — HIGH (ref 3.8–10.5)
WBC # FLD AUTO: 14.78 K/UL — HIGH (ref 3.8–10.5)

## 2022-01-29 RX ORDER — SENNA PLUS 8.6 MG/1
2 TABLET ORAL
Qty: 30 | Refills: 0
Start: 2022-01-29

## 2022-01-29 RX ORDER — OXYCODONE HYDROCHLORIDE 5 MG/1
1 TABLET ORAL
Qty: 30 | Refills: 0
Start: 2022-01-29 | End: 2022-02-02

## 2022-01-29 RX ORDER — CYCLOBENZAPRINE HYDROCHLORIDE 10 MG/1
1 TABLET, FILM COATED ORAL
Qty: 21 | Refills: 0
Start: 2022-01-29 | End: 2022-02-04

## 2022-01-29 RX ORDER — HYDROMORPHONE HYDROCHLORIDE 2 MG/ML
0.5 INJECTION INTRAMUSCULAR; INTRAVENOUS; SUBCUTANEOUS EVERY 4 HOURS
Refills: 0 | Status: DISCONTINUED | OUTPATIENT
Start: 2022-01-29 | End: 2022-01-30

## 2022-01-29 RX ORDER — OXYCODONE HYDROCHLORIDE 5 MG/1
1 TABLET ORAL
Qty: 42 | Refills: 0
Start: 2022-01-29 | End: 2022-02-04

## 2022-01-29 RX ORDER — SENNA PLUS 8.6 MG/1
2 TABLET ORAL
Qty: 0 | Refills: 0 | DISCHARGE
Start: 2022-01-29

## 2022-01-29 RX ORDER — OXYCODONE HYDROCHLORIDE 5 MG/1
10 TABLET ORAL EVERY 4 HOURS
Refills: 0 | Status: DISCONTINUED | OUTPATIENT
Start: 2022-01-29 | End: 2022-01-30

## 2022-01-29 RX ORDER — OXYCODONE HYDROCHLORIDE 5 MG/1
5 TABLET ORAL EVERY 4 HOURS
Refills: 0 | Status: DISCONTINUED | OUTPATIENT
Start: 2022-01-29 | End: 2022-01-30

## 2022-01-29 RX ADMIN — Medication 250 MILLIGRAM(S): at 13:52

## 2022-01-29 RX ADMIN — Medication 250 MILLIGRAM(S): at 21:50

## 2022-01-29 RX ADMIN — APIXABAN 2.5 MILLIGRAM(S): 2.5 TABLET, FILM COATED ORAL at 05:55

## 2022-01-29 RX ADMIN — POLYETHYLENE GLYCOL 3350 17 GRAM(S): 17 POWDER, FOR SOLUTION ORAL at 14:01

## 2022-01-29 RX ADMIN — AMLODIPINE BESYLATE 5 MILLIGRAM(S): 2.5 TABLET ORAL at 05:55

## 2022-01-29 RX ADMIN — Medication 5000 UNIT(S): at 14:01

## 2022-01-29 RX ADMIN — APIXABAN 2.5 MILLIGRAM(S): 2.5 TABLET, FILM COATED ORAL at 18:19

## 2022-01-29 RX ADMIN — LISINOPRIL 40 MILLIGRAM(S): 2.5 TABLET ORAL at 05:55

## 2022-01-29 RX ADMIN — OXYCODONE HYDROCHLORIDE 5 MILLIGRAM(S): 5 TABLET ORAL at 18:19

## 2022-01-29 RX ADMIN — ATORVASTATIN CALCIUM 20 MILLIGRAM(S): 80 TABLET, FILM COATED ORAL at 21:50

## 2022-01-29 RX ADMIN — Medication 50 MILLIGRAM(S): at 05:55

## 2022-01-29 RX ADMIN — Medication 300 MILLIGRAM(S): at 14:00

## 2022-01-29 RX ADMIN — SENNA PLUS 2 TABLET(S): 8.6 TABLET ORAL at 21:50

## 2022-01-29 RX ADMIN — OXYCODONE HYDROCHLORIDE 5 MILLIGRAM(S): 5 TABLET ORAL at 14:01

## 2022-01-29 NOTE — PROGRESS NOTE ADULT - ASSESSMENT
A/P: 66yoMale POD#1 s/p PSF/Laminectomy T6-T  OOB  Diet:  VTE: eliquis resumed  DM--taking his own medication from home.  PT

## 2022-01-29 NOTE — PROGRESS NOTE ADULT - SUBJECTIVE AND OBJECTIVE BOX
POD Nu 1    Subjective:  Patient refusing hospital medications, stating he would prefer to take his own.  Denies any SOB, CP, nausea/vomiting, numbness or tingling of b/l UES.    Vital Signs Last 24 Hrs  T(C): 36.6 (29 Jan 2022 05:53), Max: 37.1 (29 Jan 2022 00:40)  T(F): 97.9 (29 Jan 2022 05:53), Max: 98.8 (29 Jan 2022 00:40)  HR: 65 (29 Jan 2022 05:53) (57 - 98)  BP: 158/74 (29 Jan 2022 05:53) (119/61 - 172/81)  BP(mean): 116 (28 Jan 2022 17:10) (85 - 116)  RR: 18 (29 Jan 2022 05:53) (13 - 19)  SpO2: 96% (29 Jan 2022 05:53) (95% - 100%)      Physical Exam:  Dressing C/D/I  Pulses: Brachial/Radial 2+  SLT: intact b/l UES  Motor:  5/5 b/l UES  Finger Int 5/5 b/l UES  Wrist flexion/ext 5/5 b/l UES  Biceps/triceps/Delts 5/5 b/l UES  COR RRR  LUNG Clear  Abd soft      Home Medications:  allopurinol 300 mg oral tablet: 1 tab(s) orally once a day (28 Jan 2022 07:42)  amLODIPine 5 mg oral tablet: 1 tab(s) orally once a day (28 Jan 2022 07:42)  Eliquis 2.5 mg oral tablet: 1 tab(s) orally 2 times a day (28 Jan 2022 07:42)  glipiZIDE 10 mg oral tablet: orally 2 times a day (28 Jan 2022 07:42)  Jardiance 25 mg oral tablet: 1 tab(s) orally once a day (in the morning) (28 Jan 2022 07:42)  metoprolol succinate 50 mg oral capsule, extended release: 1 cap(s) orally once a day (28 Jan 2022 07:42)  pioglitazone 30 mg oral tablet: 1 tab(s) orally once a day (28 Jan 2022 07:42)  pravastatin 80 mg oral tablet: 1 tab(s) orally once a day (28 Jan 2022 07:42)  quinapril 40 mg oral tablet: 1 tab(s) orally once a day (28 Jan 2022 07:42)  Tradjenta 5 mg oral tablet: 1 tab(s) orally once a day (28 Jan 2022 07:42)  Vitamin D3 5000 intl units oral capsule: 1 cap(s) orally once a day (28 Jan 2022 07:42)    MEDICATIONS  (STANDING):  allopurinol 300 milliGRAM(s) Oral daily  amLODIPine   Tablet 5 milliGRAM(s) Oral daily  apixaban 2.5 milliGRAM(s) Oral every 12 hours  atorvastatin 20 milliGRAM(s) Oral at bedtime  BUpivacaine liposome 1.3% Injectable (no eMAR) 20 milliLiter(s) Local Injection once  cholecalciferol 5000 Unit(s) Oral daily  dextrose 40% Gel 15 Gram(s) Oral once  dextrose 5%. 1000 milliLiter(s) (50 mL/Hr) IV Continuous <Continuous>  dextrose 5%. 1000 milliLiter(s) (100 mL/Hr) IV Continuous <Continuous>  dextrose 50% Injectable 25 Gram(s) IV Push once  dextrose 50% Injectable 12.5 Gram(s) IV Push once  dextrose 50% Injectable 25 Gram(s) IV Push once  glucagon  Injectable 1 milliGRAM(s) IntraMuscular once  insulin lispro (ADMELOG) corrective regimen sliding scale   SubCutaneous Before meals and at bedtime  lactated ringers. 1000 milliLiter(s) (60 mL/Hr) IV Continuous <Continuous>  lisinopril 40 milliGRAM(s) Oral daily  metoprolol succinate ER 50 milliGRAM(s) Oral daily  polyethylene glycol 3350 17 Gram(s) Oral daily  senna 2 Tablet(s) Oral at bedtime  vancomycin  IVPB 1000 milliGRAM(s) IV Intermittent every 12 hours

## 2022-01-29 NOTE — PROGRESS NOTE ADULT - SUBJECTIVE AND OBJECTIVE BOX
Orthopedics Floor Note    Subjective:  Patient refusing hospital medications, stating he would prefer to take his own.  Denies any SOB, CP, nausea/vomiting, numbness or tingling of b/l UES.    Vital Signs Last 24 Hrs  T(C): 36.6 (29 Jan 2022 05:53), Max: 37.1 (29 Jan 2022 00:40)  T(F): 97.9 (29 Jan 2022 05:53), Max: 98.8 (29 Jan 2022 00:40)  HR: 65 (29 Jan 2022 05:53) (57 - 98)  BP: 158/74 (29 Jan 2022 05:53) (119/61 - 172/81)  BP(mean): 116 (28 Jan 2022 17:10) (85 - 116)  RR: 18 (29 Jan 2022 05:53) (13 - 19)  SpO2: 96% (29 Jan 2022 05:53) (95% - 100%)      Physical Exam:  Dressing C/D/I  Pulses: Brachial/Radial 2+  SLT: intact b/l UES  Motor:  5/5 b/l UES  Finger Int 5/5 b/l UES  Wrist flexion/ext 5/5 b/l UES  Biceps/triceps/Delts 5/5 b/l UES      A/P: 66yoMale POD#1 s/p PSF/Laminectomy T6-T9  - Stable  - Pain Control  - DVT ppx: SCDs  - Post op abx: Vancomycin 1000mg IV infusion q12hrs, stop after 3 doses  - PT, WBS: WBAT  - F/U AM Labs

## 2022-01-29 NOTE — PHYSICAL THERAPY INITIAL EVALUATION ADULT - PERTINENT HX OF CURRENT PROBLEM, REHAB EVAL
67yo m c/o low back pain x 1 year. Denies preceding trauma/injury. Pt has hx of cervical and lumbar fusion, last surgery in 2019. Pt reports his symptoms to be right sided chest wall/rib pain that radiates to his back. He denies new onset numbness/tingling/weakness of bilateral lower extremities.

## 2022-01-30 ENCOUNTER — TRANSCRIPTION ENCOUNTER (OUTPATIENT)
Age: 67
End: 2022-01-30

## 2022-01-30 VITALS
RESPIRATION RATE: 16 BRPM | OXYGEN SATURATION: 97 % | SYSTOLIC BLOOD PRESSURE: 137 MMHG | DIASTOLIC BLOOD PRESSURE: 77 MMHG | HEART RATE: 78 BPM

## 2022-01-30 LAB
ANION GAP SERPL CALC-SCNC: 13 MMOL/L — SIGNIFICANT CHANGE UP (ref 5–17)
BUN SERPL-MCNC: 24 MG/DL — HIGH (ref 7–23)
CALCIUM SERPL-MCNC: 8.9 MG/DL — SIGNIFICANT CHANGE UP (ref 8.4–10.5)
CHLORIDE SERPL-SCNC: 104 MMOL/L — SIGNIFICANT CHANGE UP (ref 96–108)
CO2 SERPL-SCNC: 20 MMOL/L — LOW (ref 22–31)
CREAT SERPL-MCNC: 1.13 MG/DL — SIGNIFICANT CHANGE UP (ref 0.5–1.3)
GLUCOSE BLDC GLUCOMTR-MCNC: 150 MG/DL — HIGH (ref 70–99)
GLUCOSE SERPL-MCNC: 170 MG/DL — HIGH (ref 70–99)
POTASSIUM SERPL-MCNC: 3.9 MMOL/L — SIGNIFICANT CHANGE UP (ref 3.5–5.3)
POTASSIUM SERPL-SCNC: 3.9 MMOL/L — SIGNIFICANT CHANGE UP (ref 3.5–5.3)
SODIUM SERPL-SCNC: 137 MMOL/L — SIGNIFICANT CHANGE UP (ref 135–145)

## 2022-01-30 PROCEDURE — 86850 RBC ANTIBODY SCREEN: CPT

## 2022-01-30 PROCEDURE — 80048 BASIC METABOLIC PNL TOTAL CA: CPT

## 2022-01-30 PROCEDURE — 36415 COLL VENOUS BLD VENIPUNCTURE: CPT

## 2022-01-30 PROCEDURE — 86901 BLOOD TYPING SEROLOGIC RH(D): CPT

## 2022-01-30 PROCEDURE — 86803 HEPATITIS C AB TEST: CPT

## 2022-01-30 PROCEDURE — C1713: CPT

## 2022-01-30 PROCEDURE — 85025 COMPLETE CBC W/AUTO DIFF WBC: CPT

## 2022-01-30 PROCEDURE — 97161 PT EVAL LOW COMPLEX 20 MIN: CPT

## 2022-01-30 PROCEDURE — C1889: CPT

## 2022-01-30 PROCEDURE — 76000 FLUOROSCOPY <1 HR PHYS/QHP: CPT

## 2022-01-30 PROCEDURE — 80202 ASSAY OF VANCOMYCIN: CPT

## 2022-01-30 PROCEDURE — 82962 GLUCOSE BLOOD TEST: CPT

## 2022-01-30 PROCEDURE — 86900 BLOOD TYPING SEROLOGIC ABO: CPT

## 2022-01-30 PROCEDURE — 83036 HEMOGLOBIN GLYCOSYLATED A1C: CPT

## 2022-01-30 RX ADMIN — OXYCODONE HYDROCHLORIDE 5 MILLIGRAM(S): 5 TABLET ORAL at 00:29

## 2022-01-30 RX ADMIN — APIXABAN 2.5 MILLIGRAM(S): 2.5 TABLET, FILM COATED ORAL at 05:51

## 2022-01-30 RX ADMIN — OXYCODONE HYDROCHLORIDE 5 MILLIGRAM(S): 5 TABLET ORAL at 09:54

## 2022-01-30 RX ADMIN — OXYCODONE HYDROCHLORIDE 5 MILLIGRAM(S): 5 TABLET ORAL at 05:51

## 2022-01-30 RX ADMIN — Medication 50 MILLIGRAM(S): at 05:51

## 2022-01-30 RX ADMIN — OXYCODONE HYDROCHLORIDE 5 MILLIGRAM(S): 5 TABLET ORAL at 06:21

## 2022-01-30 RX ADMIN — AMLODIPINE BESYLATE 5 MILLIGRAM(S): 2.5 TABLET ORAL at 05:51

## 2022-01-30 RX ADMIN — OXYCODONE HYDROCHLORIDE 5 MILLIGRAM(S): 5 TABLET ORAL at 01:00

## 2022-01-30 NOTE — DISCHARGE NOTE NURSING/CASE MANAGEMENT/SOCIAL WORK - NSDCPEFALRISK_GEN_ALL_CORE
For information on Fall & Injury Prevention, visit: [email or web address] OR  [email or web address] OR  https://www.cdc.gov/steadi/patient.html

## 2022-01-30 NOTE — DISCHARGE NOTE NURSING/CASE MANAGEMENT/SOCIAL WORK - PATIENT PORTAL LINK FT
You can access the FollowMyHealth Patient Portal offered by [facility] by registering at the following website: [email or web address]. By joining [facility]’s FollowMyHealth portal, you will also be able to view your health information using other applications (apps) compatible with our system.

## 2022-02-07 DIAGNOSIS — E11.9 TYPE 2 DIABETES MELLITUS WITHOUT COMPLICATIONS: ICD-10-CM

## 2022-02-07 DIAGNOSIS — Z86.711 PERSONAL HISTORY OF PULMONARY EMBOLISM: ICD-10-CM

## 2022-02-07 DIAGNOSIS — M48.04 SPINAL STENOSIS, THORACIC REGION: ICD-10-CM

## 2022-02-07 DIAGNOSIS — M10.9 GOUT, UNSPECIFIED: ICD-10-CM

## 2022-02-07 DIAGNOSIS — E78.00 PURE HYPERCHOLESTEROLEMIA, UNSPECIFIED: ICD-10-CM

## 2022-02-07 DIAGNOSIS — I10 ESSENTIAL (PRIMARY) HYPERTENSION: ICD-10-CM

## 2022-02-07 DIAGNOSIS — Z86.718 PERSONAL HISTORY OF OTHER VENOUS THROMBOSIS AND EMBOLISM: ICD-10-CM

## 2022-02-07 DIAGNOSIS — M51.24 OTHER INTERVERTEBRAL DISC DISPLACEMENT, THORACIC REGION: ICD-10-CM

## 2022-02-14 ENCOUNTER — OUTPATIENT (OUTPATIENT)
Dept: OUTPATIENT SERVICES | Facility: HOSPITAL | Age: 67
LOS: 1 days | End: 2022-02-14
Payer: MEDICARE

## 2022-02-14 DIAGNOSIS — Z90.89 ACQUIRED ABSENCE OF OTHER ORGANS: Chronic | ICD-10-CM

## 2022-02-14 DIAGNOSIS — Z98.890 OTHER SPECIFIED POSTPROCEDURAL STATES: Chronic | ICD-10-CM

## 2022-02-14 DIAGNOSIS — Z98.1 ARTHRODESIS STATUS: Chronic | ICD-10-CM

## 2022-02-14 DIAGNOSIS — Z90.49 ACQUIRED ABSENCE OF OTHER SPECIFIED PARTS OF DIGESTIVE TRACT: Chronic | ICD-10-CM

## 2022-02-14 PROBLEM — Z86.718 PERSONAL HISTORY OF OTHER VENOUS THROMBOSIS AND EMBOLISM: Chronic | Status: ACTIVE | Noted: 2022-01-27

## 2022-02-14 PROBLEM — Z86.711 PERSONAL HISTORY OF PULMONARY EMBOLISM: Chronic | Status: ACTIVE | Noted: 2022-01-27

## 2022-02-14 PROCEDURE — 72040 X-RAY EXAM NECK SPINE 2-3 VW: CPT | Mod: 26

## 2022-02-14 PROCEDURE — 72070 X-RAY EXAM THORAC SPINE 2VWS: CPT

## 2022-02-14 PROCEDURE — 72070 X-RAY EXAM THORAC SPINE 2VWS: CPT | Mod: 26

## 2022-02-14 PROCEDURE — 72040 X-RAY EXAM NECK SPINE 2-3 VW: CPT

## 2022-03-14 ENCOUNTER — OUTPATIENT (OUTPATIENT)
Dept: OUTPATIENT SERVICES | Facility: HOSPITAL | Age: 67
LOS: 1 days | End: 2022-03-14
Payer: MEDICARE

## 2022-03-14 DIAGNOSIS — Z98.1 ARTHRODESIS STATUS: Chronic | ICD-10-CM

## 2022-03-14 DIAGNOSIS — Z98.890 OTHER SPECIFIED POSTPROCEDURAL STATES: Chronic | ICD-10-CM

## 2022-03-14 DIAGNOSIS — Z90.49 ACQUIRED ABSENCE OF OTHER SPECIFIED PARTS OF DIGESTIVE TRACT: Chronic | ICD-10-CM

## 2022-03-14 DIAGNOSIS — Z90.89 ACQUIRED ABSENCE OF OTHER ORGANS: Chronic | ICD-10-CM

## 2022-03-14 PROCEDURE — 72070 X-RAY EXAM THORAC SPINE 2VWS: CPT | Mod: 26

## 2022-03-14 PROCEDURE — 72070 X-RAY EXAM THORAC SPINE 2VWS: CPT

## 2022-04-25 ENCOUNTER — OUTPATIENT (OUTPATIENT)
Dept: OUTPATIENT SERVICES | Facility: HOSPITAL | Age: 67
LOS: 1 days | End: 2022-04-25
Payer: MEDICARE

## 2022-04-25 DIAGNOSIS — Z90.89 ACQUIRED ABSENCE OF OTHER ORGANS: Chronic | ICD-10-CM

## 2022-04-25 DIAGNOSIS — Z98.890 OTHER SPECIFIED POSTPROCEDURAL STATES: Chronic | ICD-10-CM

## 2022-04-25 DIAGNOSIS — Z98.1 ARTHRODESIS STATUS: Chronic | ICD-10-CM

## 2022-04-25 DIAGNOSIS — Z90.49 ACQUIRED ABSENCE OF OTHER SPECIFIED PARTS OF DIGESTIVE TRACT: Chronic | ICD-10-CM

## 2022-04-25 PROCEDURE — 72100 X-RAY EXAM L-S SPINE 2/3 VWS: CPT | Mod: 26

## 2022-04-25 PROCEDURE — 72100 X-RAY EXAM L-S SPINE 2/3 VWS: CPT

## 2022-04-25 PROCEDURE — 72070 X-RAY EXAM THORAC SPINE 2VWS: CPT

## 2022-04-25 PROCEDURE — 72070 X-RAY EXAM THORAC SPINE 2VWS: CPT | Mod: 26

## 2022-05-23 NOTE — BRIEF OPERATIVE NOTE - CONDITION POST OP
[facility] Endoscopy Report      Preoperative Diagnosis:  - abdominal pain      Postoperative Diagnosis:  - hiatal hernia 4 cm  - irregular z- line  - gastritis      Procedure:    Esophagogastroduodenoscopy       Surgeon:  Sai Velez M.D. Anesthesia:  MAC sedation    Technique:  After informed consent, the patient was placed in the left lateral recumbent position. An Olympus adult HD gastroscope was inserted into the hypopharynx and advanced under direct vision into the esophagus, stomach and duodenum. The endoscope was withdrawn to the stomach where retroflexion of the annulus, body, cardia and fundus was performed. The instrument was straightened, insufflated air and fluid were suctioned and the endoscope was withdrawn. The procedure was well tolerated without immediate complication. Findings: The esophagus showed an irregular Z-line, biopsies taken at the GE junction. The GE junction and diaphragmatic impression were at 35 cm and 39 cm for sulfurous 4 cm sliding-type hiatal hernia. .  The stomach distended appropriately with insufflated air. The mucosa of the stomach showed subtle erythematous changes throughout the antrum body and fundic regions of the stomach, biopsies taken. The duodenal bulb and post bulbar regions were normal.      Estimated blood loss-insignificant  Specimens-see above    Impression:  - hiatal hernia 4 cm  - irregular z- line  - gastritis    Recommendations:  - Post procedure instructions given  - Follow up on biopsies  - Acid reflux dietary and behavioral changes, no eating 3 hours before bed          Triston Mayberry.  Nicole Arzate MD  5/23/2022  4:06 PM
stable

## 2022-07-25 ENCOUNTER — OUTPATIENT (OUTPATIENT)
Dept: OUTPATIENT SERVICES | Facility: HOSPITAL | Age: 67
LOS: 1 days | End: 2022-07-25
Payer: MEDICARE

## 2022-07-25 DIAGNOSIS — Z98.1 ARTHRODESIS STATUS: Chronic | ICD-10-CM

## 2022-07-25 DIAGNOSIS — Z98.890 OTHER SPECIFIED POSTPROCEDURAL STATES: Chronic | ICD-10-CM

## 2022-07-25 DIAGNOSIS — Z90.49 ACQUIRED ABSENCE OF OTHER SPECIFIED PARTS OF DIGESTIVE TRACT: Chronic | ICD-10-CM

## 2022-07-25 DIAGNOSIS — Z90.89 ACQUIRED ABSENCE OF OTHER ORGANS: Chronic | ICD-10-CM

## 2022-07-25 PROCEDURE — 72070 X-RAY EXAM THORAC SPINE 2VWS: CPT | Mod: 26

## 2022-07-25 PROCEDURE — 72070 X-RAY EXAM THORAC SPINE 2VWS: CPT

## 2022-07-25 PROCEDURE — 72100 X-RAY EXAM L-S SPINE 2/3 VWS: CPT | Mod: 26

## 2022-07-25 PROCEDURE — 72100 X-RAY EXAM L-S SPINE 2/3 VWS: CPT

## 2023-01-16 NOTE — PATIENT PROFILE ADULT - BRADEN FRICTION AND SHEAR
(3) no apparent problem Mid-Level Procedure Text (A): After obtaining clear surgical margins the patient was sent to a mid-level provider for surgical repair.  The patient understands they will receive post-surgical care and follow-up from the mid-level provider.

## 2023-01-21 NOTE — PATIENT PROFILE ADULT - VISION (WITH CORRECTIVE LENSES IF THE PATIENT USUALLY WEARS THEM):
[facility] for Tobacco Control website --- [email or web address] website --- [email or web address]
Normal vision: sees adequately in most situations; can see medication labels, newsprint

## 2023-09-01 NOTE — CONSULT NOTE ADULT - TIME-BASED
Yeyo Díaz discharged to home accompanied by wife.   Patient provided with the following educational materials upon discharge:  AVS.   Valuables and belongings sent with patient.   discharge summary, discharge instructions, medications and follow up appointments reviewed with patient and wife.  Patient and wife verbalized understanding. All questions answered.    67

## 2024-01-31 NOTE — PATIENT PROFILE ADULT - NSPROMUTANXFEARADDRESSFT_GEN_A_NUR
previous_biopsy_has_been_previously_biopsied Body Location Override (Optional): Right upper thigh //

## (undated) DEVICE — POSITIONER JACKSON TABLE XODUS

## (undated) DEVICE — MIDAS REX LEGEND BALL FLUTED SM BORE 6.0MM X 10CM

## (undated) DEVICE — ELCTR STRYKER NEPTUNE SMOKE EVACUATION PENCIL (GREEN)

## (undated) DEVICE — GLV 8 PROTEXIS (WHITE)

## (undated) DEVICE — DRAPE LIGHT HANDLE COVER (BLUE)

## (undated) DEVICE — DRSG MASTISOL

## (undated) DEVICE — WARMING BLANKET UPPER ADULT

## (undated) DEVICE — SUT VICRYL 1 18" CT-1 UNDYED (POP-OFF)

## (undated) DEVICE — VENODYNE/SCD SLEEVE CALF MEDIUM

## (undated) DEVICE — CLIPPER BLADE GENERAL USE

## (undated) DEVICE — SUT VICRYL PLUS 2-0 18" CT-1 (POP-OFF)

## (undated) DEVICE — DRILL BIT SYNTHES ORTHO CANN QCP 2X125MM ST

## (undated) DEVICE — VAGINAL PACKING 2"

## (undated) DEVICE — DRAPE INSTRUMENT POUCH 6.75" X 11"

## (undated) DEVICE — PACK SPINE

## (undated) DEVICE — SUT MONOCRYL 3-0 27" PS-2 UNDYED

## (undated) DEVICE — PREP BETADINE SPONGE STICKS

## (undated) DEVICE — DRAPE SURGICAL #1010

## (undated) DEVICE — DRAPE BACK TABLE COVER 80X90"

## (undated) DEVICE — FOLEY TRAY 16FR 5CC LF UMETER CLOSED